# Patient Record
Sex: MALE | Race: WHITE | NOT HISPANIC OR LATINO | Employment: OTHER | URBAN - METROPOLITAN AREA
[De-identification: names, ages, dates, MRNs, and addresses within clinical notes are randomized per-mention and may not be internally consistent; named-entity substitution may affect disease eponyms.]

---

## 2019-04-11 ENCOUNTER — TRANSCRIBE ORDERS (OUTPATIENT)
Dept: LAB | Facility: HOSPITAL | Age: 74
End: 2019-04-11

## 2019-04-11 ENCOUNTER — APPOINTMENT (OUTPATIENT)
Dept: LAB | Facility: HOSPITAL | Age: 74
End: 2019-04-11
Attending: NURSE PRACTITIONER
Payer: MEDICARE

## 2019-04-11 DIAGNOSIS — I10 ESSENTIAL (PRIMARY) HYPERTENSION: ICD-10-CM

## 2019-04-11 DIAGNOSIS — Z95.1 PRESENCE OF AORTOCORONARY BYPASS GRAFT: ICD-10-CM

## 2019-04-11 DIAGNOSIS — I25.10 ATHEROSCLEROTIC HEART DISEASE OF NATIVE CORONARY ARTERY WITHOUT ANGINA PECTORIS: Primary | ICD-10-CM

## 2019-04-11 DIAGNOSIS — I34.0 NONRHEUMATIC MITRAL VALVE INSUFFICIENCY: ICD-10-CM

## 2019-04-11 DIAGNOSIS — I65.23 OCCLUSION AND STENOSIS OF BILATERAL CAROTID ARTERIES: ICD-10-CM

## 2019-04-11 DIAGNOSIS — C61 MALIGNANT NEOPLASM OF PROSTATE (CMS/HCC): Primary | ICD-10-CM

## 2019-04-11 DIAGNOSIS — E78.00 PURE HYPERCHOLESTEROLEMIA: ICD-10-CM

## 2019-04-11 DIAGNOSIS — I25.10 ATHEROSCLEROTIC HEART DISEASE OF NATIVE CORONARY ARTERY WITHOUT ANGINA PECTORIS: ICD-10-CM

## 2019-04-11 DIAGNOSIS — C61 MALIGNANT NEOPLASM OF PROSTATE (CMS/HCC): ICD-10-CM

## 2019-04-11 DIAGNOSIS — Z98.61 CORONARY ANGIOPLASTY STATUS: ICD-10-CM

## 2019-04-11 LAB
ALBUMIN SERPL-MCNC: 4.3 G/DL (ref 3.4–5)
ALP SERPL-CCNC: 83 IU/L (ref 35–126)
ALT SERPL-CCNC: 33 IU/L (ref 16–63)
ANION GAP SERPL CALC-SCNC: 8 MEQ/L (ref 3–15)
AST SERPL-CCNC: 33 IU/L (ref 15–41)
BILIRUB SERPL-MCNC: 0.6 MG/DL (ref 0.3–1.2)
BUN SERPL-MCNC: 12 MG/DL (ref 8–20)
CALCIUM SERPL-MCNC: 9.4 MG/DL (ref 8.9–10.3)
CHLORIDE SERPL-SCNC: 107 MEQ/L (ref 98–109)
CHOLEST SERPL-MCNC: 90 MG/DL
CK SERPL-CCNC: 137 U/L (ref 16–300)
CO2 SERPL-SCNC: 26 MEQ/L (ref 22–32)
CREAT SERPL-MCNC: 0.9 MG/DL
GFR SERPL CREATININE-BSD FRML MDRD: >60 ML/MIN/1.73M*2
GLUCOSE SERPL-MCNC: 104 MG/DL (ref 70–99)
HDLC SERPL-MCNC: 37 MG/DL
HDLC SERPL: 2.4 {RATIO}
LDLC SERPL CALC-MCNC: 41 MG/DL
NONHDLC SERPL-MCNC: 53 MG/DL
POTASSIUM SERPL-SCNC: 5 MEQ/L (ref 3.6–5.1)
PROT SERPL-MCNC: 6.1 G/DL (ref 6–8.2)
PSA SERPL-MCNC: 0.96 NG/ML
SODIUM SERPL-SCNC: 141 MEQ/L (ref 136–144)
TESTOST SERPL-MCNC: 348.5 NG/DL (ref 200–800)
TRIGL SERPL-MCNC: 60 MG/DL (ref 30–149)

## 2019-04-11 PROCEDURE — 36415 COLL VENOUS BLD VENIPUNCTURE: CPT

## 2019-04-11 PROCEDURE — 82550 ASSAY OF CK (CPK): CPT

## 2019-04-11 PROCEDURE — 84403 ASSAY OF TOTAL TESTOSTERONE: CPT

## 2019-04-11 PROCEDURE — 84153 ASSAY OF PSA TOTAL: CPT

## 2019-04-11 PROCEDURE — 80061 LIPID PANEL: CPT

## 2019-04-11 PROCEDURE — 80053 COMPREHEN METABOLIC PANEL: CPT

## 2019-10-17 ENCOUNTER — APPOINTMENT (OUTPATIENT)
Dept: LAB | Facility: HOSPITAL | Age: 74
End: 2019-10-17
Attending: INTERNAL MEDICINE
Payer: MEDICARE

## 2019-10-17 ENCOUNTER — TRANSCRIBE ORDERS (OUTPATIENT)
Dept: LAB | Facility: HOSPITAL | Age: 74
End: 2019-10-17

## 2019-10-17 DIAGNOSIS — I25.10 ATHEROSCLEROTIC HEART DISEASE OF NATIVE CORONARY ARTERY WITHOUT ANGINA PECTORIS: Primary | ICD-10-CM

## 2019-10-17 DIAGNOSIS — I25.10 ATHEROSCLEROTIC HEART DISEASE OF NATIVE CORONARY ARTERY WITHOUT ANGINA PECTORIS: ICD-10-CM

## 2019-10-17 DIAGNOSIS — I10 ESSENTIAL (PRIMARY) HYPERTENSION: ICD-10-CM

## 2019-10-17 DIAGNOSIS — I65.23 OCCLUSION AND STENOSIS OF BILATERAL CAROTID ARTERIES: ICD-10-CM

## 2019-10-17 DIAGNOSIS — Z98.61 CORONARY ANGIOPLASTY STATUS: ICD-10-CM

## 2019-10-17 DIAGNOSIS — Z95.1 PRESENCE OF AORTOCORONARY BYPASS GRAFT: ICD-10-CM

## 2019-10-17 LAB
ALBUMIN SERPL-MCNC: 4.1 G/DL (ref 3.4–5)
ALP SERPL-CCNC: 69 IU/L (ref 35–126)
ALT SERPL-CCNC: 29 IU/L (ref 16–63)
ANION GAP SERPL CALC-SCNC: 7 MEQ/L (ref 3–15)
AST SERPL-CCNC: 29 IU/L (ref 15–41)
BILIRUB SERPL-MCNC: 0.9 MG/DL (ref 0.3–1.2)
BUN SERPL-MCNC: 16 MG/DL (ref 8–20)
CALCIUM SERPL-MCNC: 9 MG/DL (ref 8.9–10.3)
CHLORIDE SERPL-SCNC: 107 MEQ/L (ref 98–109)
CHOLEST SERPL-MCNC: 122 MG/DL
CO2 SERPL-SCNC: 28 MEQ/L (ref 22–32)
CREAT SERPL-MCNC: 0.9 MG/DL
GFR SERPL CREATININE-BSD FRML MDRD: >60 ML/MIN/1.73M*2
GLUCOSE SERPL-MCNC: 109 MG/DL (ref 70–99)
HDLC SERPL-MCNC: 39 MG/DL
HDLC SERPL: 3.1 {RATIO}
LDLC SERPL CALC-MCNC: 45 MG/DL
NONHDLC SERPL-MCNC: 83 MG/DL
POTASSIUM SERPL-SCNC: 4.6 MEQ/L (ref 3.6–5.1)
PROT SERPL-MCNC: 6 G/DL (ref 6–8.2)
SODIUM SERPL-SCNC: 142 MEQ/L (ref 136–144)
TRIGL SERPL-MCNC: 188 MG/DL (ref 30–149)

## 2019-10-17 PROCEDURE — 36415 COLL VENOUS BLD VENIPUNCTURE: CPT

## 2019-10-17 PROCEDURE — 80053 COMPREHEN METABOLIC PANEL: CPT

## 2019-10-17 PROCEDURE — 80061 LIPID PANEL: CPT

## 2020-09-09 ENCOUNTER — TRANSCRIBE ORDERS (OUTPATIENT)
Dept: LAB | Facility: HOSPITAL | Age: 75
End: 2020-09-09

## 2020-09-09 ENCOUNTER — APPOINTMENT (OUTPATIENT)
Dept: LAB | Facility: HOSPITAL | Age: 75
End: 2020-09-09
Attending: NURSE PRACTITIONER
Payer: MEDICARE

## 2020-09-09 DIAGNOSIS — E78.00 PURE HYPERCHOLESTEROLEMIA: ICD-10-CM

## 2020-09-09 DIAGNOSIS — C61 MALIGNANT NEOPLASM OF PROSTATE (CMS/HCC): Primary | ICD-10-CM

## 2020-09-09 DIAGNOSIS — I10 ESSENTIAL (PRIMARY) HYPERTENSION: ICD-10-CM

## 2020-09-09 DIAGNOSIS — Z98.61 CORONARY ANGIOPLASTY STATUS: ICD-10-CM

## 2020-09-09 DIAGNOSIS — Z95.1 PRESENCE OF AORTOCORONARY BYPASS GRAFT: ICD-10-CM

## 2020-09-09 DIAGNOSIS — I25.10 ATHEROSCLEROTIC HEART DISEASE OF NATIVE CORONARY ARTERY WITHOUT ANGINA PECTORIS: ICD-10-CM

## 2020-09-09 DIAGNOSIS — I65.23 OCCLUSION AND STENOSIS OF BILATERAL CAROTID ARTERIES: ICD-10-CM

## 2020-09-09 DIAGNOSIS — C61 MALIGNANT NEOPLASM OF PROSTATE (CMS/HCC): ICD-10-CM

## 2020-09-09 DIAGNOSIS — I25.10 ATHEROSCLEROTIC HEART DISEASE OF NATIVE CORONARY ARTERY WITHOUT ANGINA PECTORIS: Primary | ICD-10-CM

## 2020-09-09 LAB
ALBUMIN SERPL-MCNC: 4.4 G/DL (ref 3.4–5)
ALP SERPL-CCNC: 88 IU/L (ref 35–126)
ALT SERPL-CCNC: 33 IU/L (ref 16–63)
ANION GAP SERPL CALC-SCNC: 9 MEQ/L (ref 3–15)
AST SERPL-CCNC: 26 IU/L (ref 15–41)
BILIRUB SERPL-MCNC: 1 MG/DL (ref 0.3–1.2)
BUN SERPL-MCNC: 18 MG/DL (ref 8–20)
CALCIUM SERPL-MCNC: 9.4 MG/DL (ref 8.9–10.3)
CHLORIDE SERPL-SCNC: 106 MEQ/L (ref 98–109)
CHOLEST SERPL-MCNC: 111 MG/DL
CO2 SERPL-SCNC: 25 MEQ/L (ref 22–32)
CREAT SERPL-MCNC: 1 MG/DL (ref 0.8–1.3)
GFR SERPL CREATININE-BSD FRML MDRD: >60 ML/MIN/1.73M*2
GLUCOSE SERPL-MCNC: 97 MG/DL (ref 70–99)
HDLC SERPL-MCNC: 38 MG/DL
HDLC SERPL: 2.9 {RATIO}
LDLC SERPL CALC-MCNC: 55 MG/DL
NONHDLC SERPL-MCNC: 73 MG/DL
POTASSIUM SERPL-SCNC: 4.9 MEQ/L (ref 3.6–5.1)
PROT SERPL-MCNC: 6.4 G/DL (ref 6–8.2)
PSA SERPL-MCNC: 0.9 NG/ML
SODIUM SERPL-SCNC: 140 MEQ/L (ref 136–144)
TRIGL SERPL-MCNC: 91 MG/DL (ref 30–149)

## 2020-09-09 PROCEDURE — 84153 ASSAY OF PSA TOTAL: CPT

## 2020-09-09 PROCEDURE — 80053 COMPREHEN METABOLIC PANEL: CPT

## 2020-09-09 PROCEDURE — 80061 LIPID PANEL: CPT

## 2020-09-09 PROCEDURE — 36415 COLL VENOUS BLD VENIPUNCTURE: CPT

## 2020-09-09 PROCEDURE — 84403 ASSAY OF TOTAL TESTOSTERONE: CPT

## 2020-09-10 LAB — TESTOST SERPL-MCNC: 365.5 NG/DL (ref 200–800)

## 2021-04-14 DIAGNOSIS — Z23 ENCOUNTER FOR IMMUNIZATION: ICD-10-CM

## 2021-04-19 ENCOUNTER — APPOINTMENT (OUTPATIENT)
Dept: LAB | Facility: HOSPITAL | Age: 76
End: 2021-04-19
Attending: INTERNAL MEDICINE
Payer: MEDICARE

## 2021-04-19 ENCOUNTER — TRANSCRIBE ORDERS (OUTPATIENT)
Dept: CARDIOLOGY | Facility: HOSPITAL | Age: 76
End: 2021-04-19

## 2021-04-19 DIAGNOSIS — Z98.61 CORONARY ANGIOPLASTY STATUS: ICD-10-CM

## 2021-04-19 DIAGNOSIS — I25.10 ATHEROSCLEROTIC HEART DISEASE OF NATIVE CORONARY ARTERY WITHOUT ANGINA PECTORIS: Primary | ICD-10-CM

## 2021-04-19 DIAGNOSIS — Z95.1 PRESENCE OF AORTOCORONARY BYPASS GRAFT: ICD-10-CM

## 2021-04-19 DIAGNOSIS — I10 ESSENTIAL (PRIMARY) HYPERTENSION: ICD-10-CM

## 2021-04-19 DIAGNOSIS — I65.23 OCCLUSION AND STENOSIS OF BILATERAL CAROTID ARTERIES: ICD-10-CM

## 2021-04-19 DIAGNOSIS — I25.10 ATHEROSCLEROTIC HEART DISEASE OF NATIVE CORONARY ARTERY WITHOUT ANGINA PECTORIS: ICD-10-CM

## 2021-04-19 LAB
ALBUMIN SERPL-MCNC: 4.4 G/DL (ref 3.4–5)
ALP SERPL-CCNC: 69 IU/L (ref 35–126)
ALT SERPL-CCNC: 30 IU/L (ref 16–63)
ANION GAP SERPL CALC-SCNC: 13 MEQ/L (ref 3–15)
AST SERPL-CCNC: 27 IU/L (ref 15–41)
BILIRUB SERPL-MCNC: 1.1 MG/DL (ref 0.3–1.2)
BUN SERPL-MCNC: 22 MG/DL (ref 8–20)
CALCIUM SERPL-MCNC: 9.2 MG/DL (ref 8.9–10.3)
CHLORIDE SERPL-SCNC: 107 MEQ/L (ref 98–109)
CHOLEST SERPL-MCNC: 107 MG/DL
CO2 SERPL-SCNC: 21 MEQ/L (ref 22–32)
CREAT SERPL-MCNC: 0.9 MG/DL (ref 0.8–1.3)
GFR SERPL CREATININE-BSD FRML MDRD: >60 ML/MIN/1.73M*2
GLUCOSE SERPL-MCNC: 97 MG/DL (ref 70–99)
HDLC SERPL-MCNC: 35 MG/DL
HDLC SERPL: 3.1 {RATIO}
LDLC SERPL CALC-MCNC: 55 MG/DL
NONHDLC SERPL-MCNC: 72 MG/DL
POTASSIUM SERPL-SCNC: 4.4 MEQ/L (ref 3.6–5.1)
PROT SERPL-MCNC: 6.6 G/DL (ref 6–8.2)
SODIUM SERPL-SCNC: 141 MEQ/L (ref 136–144)
TRIGL SERPL-MCNC: 85 MG/DL (ref 30–149)

## 2021-04-19 PROCEDURE — 80053 COMPREHEN METABOLIC PANEL: CPT

## 2021-04-19 PROCEDURE — 36415 COLL VENOUS BLD VENIPUNCTURE: CPT

## 2021-04-19 PROCEDURE — 80061 LIPID PANEL: CPT

## 2021-09-24 ENCOUNTER — TRANSCRIBE ORDERS (OUTPATIENT)
Dept: REGISTRATION | Facility: HOSPITAL | Age: 76
End: 2021-09-24
Payer: MEDICARE

## 2021-09-24 ENCOUNTER — APPOINTMENT (OUTPATIENT)
Dept: LAB | Facility: HOSPITAL | Age: 76
End: 2021-09-24
Attending: INTERNAL MEDICINE
Payer: MEDICARE

## 2021-09-24 DIAGNOSIS — I25.10 ATHEROSCLEROTIC HEART DISEASE OF NATIVE CORONARY ARTERY WITHOUT ANGINA PECTORIS: Primary | ICD-10-CM

## 2021-09-24 DIAGNOSIS — I65.23 OCCLUSION AND STENOSIS OF BILATERAL CAROTID ARTERIES: ICD-10-CM

## 2021-09-24 DIAGNOSIS — Z95.1 PRESENCE OF AORTOCORONARY BYPASS GRAFT: ICD-10-CM

## 2021-09-24 DIAGNOSIS — C61 MALIGNANT NEOPLASM OF PROSTATE (CMS/HCC): Primary | ICD-10-CM

## 2021-09-24 DIAGNOSIS — Z98.61 CORONARY ANGIOPLASTY STATUS: ICD-10-CM

## 2021-09-24 DIAGNOSIS — C61 MALIGNANT NEOPLASM OF PROSTATE (CMS/HCC): ICD-10-CM

## 2021-09-24 DIAGNOSIS — I10 ESSENTIAL (PRIMARY) HYPERTENSION: ICD-10-CM

## 2021-09-24 DIAGNOSIS — I25.10 ATHEROSCLEROTIC HEART DISEASE OF NATIVE CORONARY ARTERY WITHOUT ANGINA PECTORIS: ICD-10-CM

## 2021-09-24 LAB
ALBUMIN SERPL-MCNC: 4.2 G/DL (ref 3.4–5)
ALP SERPL-CCNC: 75 IU/L (ref 35–126)
ALT SERPL-CCNC: 39 IU/L (ref 16–63)
ANION GAP SERPL CALC-SCNC: 11 MEQ/L (ref 3–15)
AST SERPL-CCNC: 34 IU/L (ref 15–41)
BILIRUB SERPL-MCNC: 1 MG/DL (ref 0.3–1.2)
BUN SERPL-MCNC: 13 MG/DL (ref 8–20)
CALCIUM SERPL-MCNC: 9.2 MG/DL (ref 8.9–10.3)
CHLORIDE SERPL-SCNC: 105 MEQ/L (ref 98–109)
CHOLEST SERPL-MCNC: 128 MG/DL
CO2 SERPL-SCNC: 27 MEQ/L (ref 22–32)
CREAT SERPL-MCNC: 0.9 MG/DL (ref 0.8–1.3)
GFR SERPL CREATININE-BSD FRML MDRD: >60 ML/MIN/1.73M*2
GLUCOSE SERPL-MCNC: 104 MG/DL (ref 70–99)
HDLC SERPL-MCNC: 44 MG/DL
HDLC SERPL: 2.9 {RATIO}
LDLC SERPL CALC-MCNC: 60 MG/DL
NONHDLC SERPL-MCNC: 84 MG/DL
POTASSIUM SERPL-SCNC: 4.7 MEQ/L (ref 3.6–5.1)
PROT SERPL-MCNC: 6.3 G/DL (ref 6–8.2)
PSA SERPL-MCNC: 1.07 NG/ML
SODIUM SERPL-SCNC: 143 MEQ/L (ref 136–144)
TESTOST SERPL-MCNC: 333.3 NG/DL (ref 200–800)
TRIGL SERPL-MCNC: 118 MG/DL (ref 30–149)

## 2021-09-24 PROCEDURE — 80061 LIPID PANEL: CPT

## 2021-09-24 PROCEDURE — 80053 COMPREHEN METABOLIC PANEL: CPT

## 2021-09-24 PROCEDURE — 84403 ASSAY OF TOTAL TESTOSTERONE: CPT

## 2021-09-24 PROCEDURE — 84153 ASSAY OF PSA TOTAL: CPT

## 2021-09-24 PROCEDURE — 36415 COLL VENOUS BLD VENIPUNCTURE: CPT

## 2022-04-16 ENCOUNTER — APPOINTMENT (OUTPATIENT)
Dept: LAB | Facility: HOSPITAL | Age: 77
End: 2022-04-16
Attending: INTERNAL MEDICINE
Payer: MEDICARE

## 2022-04-16 ENCOUNTER — TRANSCRIBE ORDERS (OUTPATIENT)
Dept: LAB | Facility: HOSPITAL | Age: 77
End: 2022-04-16

## 2022-04-16 DIAGNOSIS — Z95.1 PRESENCE OF AORTOCORONARY BYPASS GRAFT: Primary | ICD-10-CM

## 2022-04-16 DIAGNOSIS — Z95.1 PRESENCE OF AORTOCORONARY BYPASS GRAFT: ICD-10-CM

## 2022-04-16 DIAGNOSIS — Z98.61 CORONARY ANGIOPLASTY STATUS: ICD-10-CM

## 2022-04-16 LAB
ALBUMIN SERPL-MCNC: 4.2 G/DL (ref 3.4–5)
ALP SERPL-CCNC: 81 IU/L (ref 35–126)
ALT SERPL-CCNC: 32 IU/L (ref 16–63)
ANION GAP SERPL CALC-SCNC: 7 MEQ/L (ref 3–15)
AST SERPL-CCNC: 29 IU/L (ref 15–41)
BILIRUB SERPL-MCNC: 0.9 MG/DL (ref 0.3–1.2)
BUN SERPL-MCNC: 14 MG/DL (ref 8–20)
CALCIUM SERPL-MCNC: 9.3 MG/DL (ref 8.9–10.3)
CHLORIDE SERPL-SCNC: 104 MEQ/L (ref 98–109)
CHOLEST SERPL-MCNC: 110 MG/DL
CO2 SERPL-SCNC: 28 MEQ/L (ref 22–32)
CREAT SERPL-MCNC: 0.9 MG/DL (ref 0.8–1.3)
GFR SERPL CREATININE-BSD FRML MDRD: >60 ML/MIN/1.73M*2
GLUCOSE SERPL-MCNC: 104 MG/DL (ref 70–99)
HDLC SERPL-MCNC: 30 MG/DL
HDLC SERPL: 3.7 {RATIO}
LDLC SERPL CALC-MCNC: 63 MG/DL
NONHDLC SERPL-MCNC: 80 MG/DL
POTASSIUM SERPL-SCNC: 4.9 MEQ/L (ref 3.6–5.1)
PROT SERPL-MCNC: 6.2 G/DL (ref 6–8.2)
SODIUM SERPL-SCNC: 139 MEQ/L (ref 136–144)
TRIGL SERPL-MCNC: 87 MG/DL (ref 30–149)

## 2022-04-16 PROCEDURE — 80053 COMPREHEN METABOLIC PANEL: CPT

## 2022-04-16 PROCEDURE — 80061 LIPID PANEL: CPT

## 2022-04-16 PROCEDURE — 36415 COLL VENOUS BLD VENIPUNCTURE: CPT

## 2022-09-21 ENCOUNTER — TRANSCRIBE ORDERS (OUTPATIENT)
Dept: REGISTRATION | Facility: HOSPITAL | Age: 77
End: 2022-09-21

## 2022-09-21 DIAGNOSIS — I10 ESSENTIAL (PRIMARY) HYPERTENSION: ICD-10-CM

## 2022-09-21 DIAGNOSIS — Z95.1 PRESENCE OF AORTOCORONARY BYPASS GRAFT: ICD-10-CM

## 2022-09-21 DIAGNOSIS — Z98.61 CORONARY ANGIOPLASTY STATUS: ICD-10-CM

## 2022-09-21 DIAGNOSIS — I65.23 OCCLUSION AND STENOSIS OF BILATERAL CAROTID ARTERIES: ICD-10-CM

## 2022-09-21 DIAGNOSIS — I25.10 ATHEROSCLEROTIC HEART DISEASE OF NATIVE CORONARY ARTERY WITHOUT ANGINA PECTORIS: Primary | ICD-10-CM

## 2022-09-21 DIAGNOSIS — I34.0 NONRHEUMATIC MITRAL (VALVE) INSUFFICIENCY: ICD-10-CM

## 2022-09-23 ENCOUNTER — APPOINTMENT (OUTPATIENT)
Dept: LAB | Facility: HOSPITAL | Age: 77
End: 2022-09-23
Attending: NURSE PRACTITIONER
Payer: MEDICARE

## 2022-09-23 ENCOUNTER — TRANSCRIBE ORDERS (OUTPATIENT)
Dept: REGISTRATION | Facility: HOSPITAL | Age: 77
End: 2022-09-23

## 2022-09-23 DIAGNOSIS — Z98.61 CORONARY ANGIOPLASTY STATUS: ICD-10-CM

## 2022-09-23 DIAGNOSIS — Z95.1 PRESENCE OF AORTOCORONARY BYPASS GRAFT: ICD-10-CM

## 2022-09-23 DIAGNOSIS — I34.0 NONRHEUMATIC MITRAL (VALVE) INSUFFICIENCY: ICD-10-CM

## 2022-09-23 DIAGNOSIS — Z85.46 PERSONAL HISTORY OF MALIGNANT NEOPLASM OF PROSTATE: Primary | ICD-10-CM

## 2022-09-23 DIAGNOSIS — Z85.46 PERSONAL HISTORY OF MALIGNANT NEOPLASM OF PROSTATE: ICD-10-CM

## 2022-09-23 DIAGNOSIS — I10 ESSENTIAL (PRIMARY) HYPERTENSION: ICD-10-CM

## 2022-09-23 DIAGNOSIS — I25.10 ATHEROSCLEROTIC HEART DISEASE OF NATIVE CORONARY ARTERY WITHOUT ANGINA PECTORIS: Primary | ICD-10-CM

## 2022-09-23 DIAGNOSIS — I25.10 ATHEROSCLEROTIC HEART DISEASE OF NATIVE CORONARY ARTERY WITHOUT ANGINA PECTORIS: ICD-10-CM

## 2022-09-23 DIAGNOSIS — I65.23 OCCLUSION AND STENOSIS OF BILATERAL CAROTID ARTERIES: ICD-10-CM

## 2022-09-23 LAB
ALBUMIN SERPL-MCNC: 4.3 G/DL (ref 3.4–5)
ALP SERPL-CCNC: 81 IU/L (ref 35–126)
ALT SERPL-CCNC: 30 IU/L (ref 16–63)
ANION GAP SERPL CALC-SCNC: 5 MEQ/L (ref 3–15)
AST SERPL-CCNC: 28 IU/L (ref 15–41)
BILIRUB SERPL-MCNC: 1.3 MG/DL (ref 0.3–1.2)
BUN SERPL-MCNC: 14 MG/DL (ref 8–20)
CALCIUM SERPL-MCNC: 9.4 MG/DL (ref 8.9–10.3)
CHLORIDE SERPL-SCNC: 105 MEQ/L (ref 98–109)
CHOLEST SERPL-MCNC: 110 MG/DL
CO2 SERPL-SCNC: 30 MEQ/L (ref 22–32)
CREAT SERPL-MCNC: 1 MG/DL (ref 0.8–1.3)
GFR SERPL CREATININE-BSD FRML MDRD: >60 ML/MIN/1.73M*2
GLUCOSE SERPL-MCNC: 107 MG/DL (ref 70–99)
HDLC SERPL-MCNC: 43 MG/DL
HDLC SERPL: 2.6 {RATIO}
LDLC SERPL CALC-MCNC: 53 MG/DL
NONHDLC SERPL-MCNC: 67 MG/DL
POTASSIUM SERPL-SCNC: 4.5 MEQ/L (ref 3.6–5.1)
PROT SERPL-MCNC: 6.4 G/DL (ref 6–8.2)
PSA SERPL-MCNC: 1.09 NG/ML
SODIUM SERPL-SCNC: 140 MEQ/L (ref 136–144)
TRIGL SERPL-MCNC: 72 MG/DL (ref 30–149)

## 2022-09-23 PROCEDURE — 84403 ASSAY OF TOTAL TESTOSTERONE: CPT

## 2022-09-23 PROCEDURE — 36415 COLL VENOUS BLD VENIPUNCTURE: CPT

## 2022-09-23 PROCEDURE — 84153 ASSAY OF PSA TOTAL: CPT

## 2022-09-23 PROCEDURE — 80053 COMPREHEN METABOLIC PANEL: CPT

## 2022-09-23 PROCEDURE — 80061 LIPID PANEL: CPT

## 2022-09-24 LAB — TESTOST SERPL-MCNC: 330.7 NG/DL (ref 200–800)

## 2023-09-18 ENCOUNTER — APPOINTMENT (OUTPATIENT)
Dept: LAB | Facility: HOSPITAL | Age: 78
End: 2023-09-18
Attending: NURSE PRACTITIONER
Payer: MEDICARE

## 2023-09-18 ENCOUNTER — TRANSCRIBE ORDERS (OUTPATIENT)
Dept: REGISTRATION | Facility: HOSPITAL | Age: 78
End: 2023-09-18

## 2023-09-18 DIAGNOSIS — Z98.61 CORONARY ANGIOPLASTY STATUS: ICD-10-CM

## 2023-09-18 DIAGNOSIS — I65.23 OCCLUSION AND STENOSIS OF BILATERAL CAROTID ARTERIES: ICD-10-CM

## 2023-09-18 DIAGNOSIS — I10 ESSENTIAL (PRIMARY) HYPERTENSION: ICD-10-CM

## 2023-09-18 DIAGNOSIS — Z85.46 PERSONAL HISTORY OF MALIGNANT NEOPLASM OF PROSTATE: Primary | ICD-10-CM

## 2023-09-18 DIAGNOSIS — Z85.46 PERSONAL HISTORY OF MALIGNANT NEOPLASM OF PROSTATE: ICD-10-CM

## 2023-09-18 DIAGNOSIS — I25.10 ATHEROSCLEROTIC HEART DISEASE OF NATIVE CORONARY ARTERY WITHOUT ANGINA PECTORIS: ICD-10-CM

## 2023-09-18 DIAGNOSIS — Z95.1 PRESENCE OF AORTOCORONARY BYPASS GRAFT: ICD-10-CM

## 2023-09-18 DIAGNOSIS — I25.10 ATHEROSCLEROTIC HEART DISEASE OF NATIVE CORONARY ARTERY WITHOUT ANGINA PECTORIS: Primary | ICD-10-CM

## 2023-09-18 LAB
ALBUMIN SERPL-MCNC: 4.4 G/DL (ref 3.5–5.7)
ALP SERPL-CCNC: 65 IU/L (ref 34–125)
ALT SERPL-CCNC: 29 IU/L (ref 7–52)
ANION GAP SERPL CALC-SCNC: 7 MEQ/L (ref 3–15)
AST SERPL-CCNC: 22 IU/L (ref 13–39)
BILIRUB SERPL-MCNC: 0.9 MG/DL (ref 0.3–1.2)
BUN SERPL-MCNC: 17 MG/DL (ref 7–25)
CALCIUM SERPL-MCNC: 9.2 MG/DL (ref 8.6–10.3)
CHLORIDE SERPL-SCNC: 103 MEQ/L (ref 98–107)
CHOLEST SERPL-MCNC: 108 MG/DL
CO2 SERPL-SCNC: 29 MEQ/L (ref 21–31)
CREAT SERPL-MCNC: 0.8 MG/DL (ref 0.7–1.3)
GFR SERPL CREATININE-BSD FRML MDRD: >60 ML/MIN/1.73M*2
GLUCOSE SERPL-MCNC: 98 MG/DL (ref 70–99)
HDLC SERPL-MCNC: 35 MG/DL
HDLC SERPL: 3.1 {RATIO}
LDLC SERPL CALC-MCNC: 52 MG/DL
NONHDLC SERPL-MCNC: 73 MG/DL
POTASSIUM SERPL-SCNC: 4.1 MEQ/L (ref 3.5–5.1)
PROT SERPL-MCNC: 6.7 G/DL (ref 6–8.2)
PSA SERPL-MCNC: 0.35 NG/ML
SODIUM SERPL-SCNC: 139 MEQ/L (ref 136–145)
TESTOST SERPL-MCNC: 319.7 NG/DL (ref 200–800)
TRIGL SERPL-MCNC: 103 MG/DL

## 2023-09-18 PROCEDURE — 36415 COLL VENOUS BLD VENIPUNCTURE: CPT

## 2023-09-18 PROCEDURE — 84403 ASSAY OF TOTAL TESTOSTERONE: CPT

## 2023-09-18 PROCEDURE — 84153 ASSAY OF PSA TOTAL: CPT

## 2023-09-18 PROCEDURE — 80061 LIPID PANEL: CPT

## 2023-09-18 PROCEDURE — 80053 COMPREHEN METABOLIC PANEL: CPT

## 2023-09-25 ENCOUNTER — TRANSCRIBE ORDERS (OUTPATIENT)
Dept: PHYSICAL THERAPY | Facility: REHABILITATION | Age: 78
End: 2023-09-25

## 2023-09-25 DIAGNOSIS — S70.12XA CONTUSION OF LEFT THIGH, INITIAL ENCOUNTER: Primary | ICD-10-CM

## 2023-10-11 ENCOUNTER — HOSPITAL ENCOUNTER (OUTPATIENT)
Dept: PHYSICAL THERAPY | Facility: REHABILITATION | Age: 78
Setting detail: THERAPIES SERIES
Discharge: HOME | End: 2023-10-11
Attending: FAMILY MEDICINE
Payer: MEDICARE

## 2023-10-11 DIAGNOSIS — S70.12XA CONTUSION OF LEFT THIGH, INITIAL ENCOUNTER: ICD-10-CM

## 2023-10-11 PROCEDURE — 97530 THERAPEUTIC ACTIVITIES: CPT | Mod: GP

## 2023-10-11 PROCEDURE — 97162 PT EVAL MOD COMPLEX 30 MIN: CPT | Mod: GP

## 2023-10-11 NOTE — LETTER
Dear DR. Virk,    Thank you for this referral. Please review the attached notes and plan of care for your approval.  Please contact our department with any questions.     Sincerely,     Christine Blair Mulvihill, PT  [address]  [place] [address]  Phone [phone]  Fax  [phone]    By co-signing this Plan of Care (POC) you agree to the following:  I have reviewed the the Plan of Care established by the therapist within this document and certify that the services are skilled and medically necessary. I have reviewed the plan and recommend that these services continue to meet the goals stated in this document.    PHYSICIAN SIGNATURE: __________________________________     DATE: ___________________  TIME: _____________           Physical Therapy Plan of Care 10/11/23   Effective from: 10/11/2023  Effective to: 2024    Plan ID: [id number]            Participants as of Finalize on 10/14/2023    Name Type Comments Contact Info    Eduardo Virk MD PCP - General  [phone]    Christine Blair Mulvihill, PT Physical Therapist         Last Progress Notes Note     Author: Mulvihill, Christine Blair, PT Status: Signed Last edited: 10/11/2023  2:00 PM         Physical Therapy Evaluation    [facility] Fax: [phone]    PT EVALUATION FOR OUTPATIENT THERAPY    Patient: Les Garcia    MRN: [id number]  : 1945 78 y.o.     Referring Physician: Eduardo Virk MD  Date of Visit: 10/11/2023      Certification Dates:  10/11/23 through 24         Recommended Frequency & Duration:  2 times/week for up to 3 months     Diagnosis:   1. Contusion of left thigh, initial encounter        Chief Complaints: No chief complaint on file.      Precautions: cardiac  Precautions additional comments: MI, cardiac stent, right foot drop    Past Medical History:   Past Medical History:   Diagnosis Date    Fracture of both wrists     MI (myocardial infarction) (CMS/[facility])     Prostate cancer (CMS/[facility])      radiation treatment    Right foot drop 1976    Right knee dislocation 1976       Past Surgical History:   Past Surgical History:   Procedure Laterality Date    CORONARY ANGIOPLASTY WITH STENT PLACEMENT      per pt report    CORONARY ARTERY BYPASS GRAFT  2002    quintuple per pt reports    DISTAL POPLITEAL ARTERY BYPASS Right     per pt report         LEARNING ASSESSMENT    Assessment completed:  Yes    Learner name:  Les    Learner: Patient    Learning Barriers:  Learning barriers: No Barriers    Preferred Language: English     Needed: No    Education Provided:   Method: Discussion and Demonstration  Readiness: eager  Response: Needs reinforcement      CO-LEARNER ASSESSMENT:    Completed: No    Welcome letter discussed: Yes Patient provided with Welcome Letter, which includes attendance policy. Provided education regarding cancellation and no-show policy. Education regarding the importance of participation and regular attendance to maximize goal attainment.         OBJECTIVE MEASUREMENTS/DATA:    Time In Session:  Start Time: 1400  Stop Time: 1500  Time Calculation (min): 60 min   Assessment and Plan - 10/12/23 1243        Assessment    Plan of Care reviewed and patient/family in agreement Yes     System Pathology/Pathophysiology Noted cardiovascular;musculoskeletal     Actions taken IE, TA     Clinical Assessment Les presents s/p fall due to right foot drop resulting in pain in his left thigh/hamstring.  Objectively Les shows decreased flexibility of his left HS, hip rotators, decreased strength of left HS, hip extensors/add/abductors, inability to SLS bilaterally, limited repetitions with 30 sec STS, tightness/spasm with palption to left medial HS, and an overall decrease in right LE ROM and strength which directly affects the functioning of his left LE.  Per LEFS, Les is currently 76% disabled and appears as a good candidate for PT per POC to not only address left thigh pain and dysfunction  but to address the foot of the fall which is his right foot drop.  He will benefit from assessment by his MD and  an orthotist to assess if an AFO is appropriate to address his right foot drop to prevent gait deviations and left leg pain which may lead to further falls and potential injury.     Plan and Recommendations Initiate left thigh PT as ordered and address right foot drop/gait deviation.  Request referral to orthotist by MD.  Issue primary HEP.     Planned Services CPT 73168 Manual therapy;CPT 25575 Neuromuscular Reeducation;CPT 32565 Orthotics training (Initial encounter);CPT 01503 Orthotics/Prosthetics management and training (Subsequent encounters);CPT 48149 Self-care/Home management training;CPT 29795 Therapeutic activities;CPT 12691 Therapeutic exercises;CPT 17844 Electrical stimulation UNATTENDED;CPT 35292 Hot/Cold Packs therapy;CPT 90597 Ultrasound;CPT 52311 Gait training                General Information - 10/11/23 4643        Session Details    Document Type initial evaluation     Mode of Treatment individual therapy        General Information    Onset of Illness/Injury or Date of Surgery 09/06/23     Referring Physician Eduardo Virk MD     History of present illness/functional impairment On Sept 6th tripped on his right drop foot and twisted around landing on his left hip area.  Pt was seen in ED and special tests were performed (-) for fractures. Pt was away at the time on vacation so followed up with ortho upon return and was referred to outpatient PT. 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   He notes that he does have some dull pain lingering however it is overall much improved. adls:  increased pain with walking fast,  aching in leg with adls with associated decreased mobility.   Pts goals:  eliminate left Hamstring pain and improve mobility.  He would also like to not trip  anymore so would like his right foot drop addressed at some point including possible AFOs.     Existing Precautions/Restrictions cardiac     Precautions comments MI, cardiac stent, right foot drop         Services    Do You Speak a Language Other Than English at Home? no                Pain/Vitals - 10/11/23 1353        Pain Assessment    Currently in pain Yes     Preferred Pain Scale number (Numeric Rating Pain Scale)     Pain Side/Orientation left     Pain: Body location Leg   hamstring    Pain Rating (0-10): Pre Activity 2   best = 2/10   worst = 4/10       Pre Activity Vital Signs    Pulse 60     Oxygen Therapy None (Room air)     /64     BP Method Manual     Patient Position Sitting                Falls/Food Screening - 10/11/23 1353        Initial Falls Assessment    One or more falls in the last year Yes     How many times 2 or more   fell 3x due to right foot drop    Was the patient injured in any fall Yes   left HS injury he is her to address    Fall prevention interventions recommended Use assistive and mobility devices   will refer to services for possible assessment of AFO or appropriate gait aid       Food Insecurity    Within the past 12 months, you worried that your food would run out before you got the money to buy more. Never true     Within the past 12 months, the food you bought just didn't last and you didn't have money to get more. Never true                PT - 10/11/23 2255        Physical Therapy    Physical Therapy [facility] PT        PT Plan    Frequency of treatment 2 times/week     PT Duration 3 months     PT Cert From 10/11/23     PT Cert To 01/11/24     Date PT POC was sent to provider 10/12/23     Signed PT Plan of Care received?  No                   ROM    Range of Motion - 10/14/23 2300        RIGHT: Lower Extremity PROM Assessment    Hip Flexion  80 degrees   SLR    Hip Internal Rotation  22 degrees     Hip External Rotation  45 degrees         LEFT: Lower Extremity PROM Assessment    Hip Flexion  58 degrees   SLR    Hip Internal Rotation  14 degrees     Hip External Rotation  45 degrees        RIGHT: Lower Extremity AROM Assessment    Hip Flexion   90 degrees     Knee Flexion   132     Knee Extension   0     Ankle Dorsiflexion   -15 degrees     Ankle Plantarflexion   58 degrees     Ankle Inversion   24 degrees     Ankle Eversion   4 degrees        LEFT: Lower Extremity AROM Assessment    Hip Flexion   102 degrees     Hip Extension   113 degrees     Ankle Dorsiflexion   8 degrees     Ankle Plantarflexion   55 degrees     Ankle Inversion   30 degrees     Ankle Eversion   18 degrees               MMT    Manual Muscle Tests - 10/14/23 2300        RIGHT: Lower Extremity Manual Muscle Test Assessment    Hip Flexion gross movement (4+/5) good plus     Hip Extension gross movement (5/5) normal     Hip Abduction gross movement (5/5) normal     Hip Adduction gross movement (5/5) normal     Knee Flexion strength (4/5) good     Knee Extension strength (5/5) normal     Ankle Dorsiflexion gross movement (2/5) poor     Ankle Plantarflexion gross movement (4+/5) good plus     Ankle Inversion gross movement (4/5) good     Ankle Eversion gross movement (4-/5) good minus        LEFT: Lower Extremity Manual Muscle Test Assessment    Hip Flexion gross movement (5/5) normal     Hip Extension gross movement (4/5) good     Hip Abduction gross movement (4+/5) good plus     Hip Adduction gross movement (4+/5) good plus     Knee Flexion strength (4/5) good     Knee Extension strength (5/5) normal     Ankle Dorsiflexion gross movement (5/5) normal     Ankle Plantarflexion gross movement (5/5) normal     Ankle Inversion gross movement (4+/5) good plus     Ankle Eversion gross movement (4-/5) good minus               Palpation    Palpation - 10/11/23 1800        Palpation    LE Palpation  tender with spasm to left medial HS               Gait and Mobility    Gait and Mobility -  "10/14/23 2300        Gait Training    [place], Gait modified independence     Gait surface comments flat     Assistive Device none     Deviations/Abnormal Patterns left sided deviations;right sided deviations;steppage                  Goals       <enter goal here> (pt-stated)       Mutually agreed upon pain goal       Mutually agreed upon pain goal:  Les will be able to ambulate at a fast pace without complaints of left leg pain.        PT left leg/gait dysfunction Goals           Short & Long Term Goals Time Frame Result Comment/Progress   Assess 6mWT 2 weeks        Pt will be independent in primary HEP 4-6 weeks       Patient pain will decrease from >5/10 to less <3/10 for all functional mobility  4-6 weeks         Pt with decreased pain while \"walking fast\" with ability to ambulate on TM for 5 minutes at 2.5-3.0 mph.   4-6 weeks       Pt will increase bilateral SLR by 5-10 deg to allow for improved stride length to normalize gait pattern. 4-6 weeks       Pt will show improved strength of right HS, hip add/abd/ext by 1/2 grade to allow for improved gait and tolerance for adls. 8-12 weeks       Increase LEFS >/= 9 points to increase function. 4-6 weeks       Increase LEFS >/= 18 points to increase function   8-12 weeks       Improve 30 sec STS to 13 reps or more without UE use to indicate improving LE functional strength. 8-12 weeks       Pt will be independent with advanced HEP to increase carryover at home 8-12 weeks       Pt will increase distance during 6mWT by 191 feet or more to meet the MCID indicating significant improvement in endurance. 4-6 weeks       Pt will increase distance during 6mWT by an additional 191 feet or more to meet the MCID indicating significant improvement in endurance. 8-12 weeks       Pt will be properly referred to MD and orthotist for assessment for right LE AFO to improve gait dynamics to decrease left LE pain. 8-12 weeks                    TREATMENT PLAN:    ORTHO PT " FLOWSHEET    Exercises Current Session Time PERFORMED?   MODALITIES- HEAT/ICE  CPT 07289 TOTAL TIME FOR SESSION Not performed    Heat     Ice/Vasocompression     MODALITIES - E-STIM  CPT 76887   TOTAL TIME FOR SESSION Not performed    E-stim/H-Wave     MODALITIES - US TOTAL TIME FOR SESSION Not performed    US (CPT 17480)     Iontophoresis (CPT 63828)     Mechanical Traction     THER ACT  CPT 81014 TOTAL TIME FOR SESSION 8-22 Minutes    Pt education Pt educated regarding IE results and POC and need for active participation in skilled PT intervention and HEP.  Discussed with pt follow up with MD regarding possible right LE AFO to avoid further tripping and reinjury. Y   Transfer training     Body Mechanics/Work Training     THER EX  CPT 67502 TOTAL TIME FOR SESSION Not performed    CARDIOVASCULAR      Nu Step nv    Stationary Bike     Elliptical     Treadmill     UBE     STRENGTHENING     Supine ther-ex     Seated ther-ex     Standing ther-ex     STRETCHING     LE stretching     Other stretching     REPEATED MOVEMENTS     NEURO RE-ED  CPT 28710 TOTAL TIME FOR SESSION Not performed    COORDINATION     POSTURAL RE-ED        PRE-GAIT ACTIVITIES      BALANCE TRAINING      Sitting balance     Standing balance     GAIT TRAINING  CPT 75294 TOTAL TIME FOR SESSION Not performed    Ambulation  With trial right AFO    Dynamic gait      Stair negotiation     Curb negotiation     Ramp negotiation     Outdoor ambulation     BWS/vector training     MANUAL  CPT 50990 TOTAL TIME FOR SESSION Not performed    Stretching       B HS       B hip rotators       B hip flexors     Mobilization      Massage DTM to left Medial HS    Taping  prn        ASSESSMENT:    This 78 y.o. year old male presents to PT with above stated diagnosis. Physical Therapy evaluation reveals   resulting in   limitations. Les Garcia will benefit from skilled PT services to address limitation, work towards rehab and patient goals and maximize PLOF of chosen  ADLs.     Planned Services: The patient's treatment will include CPT 27716 Manual therapy, CPT 51622 Neuromuscular Reeducation, CPT 13575 Orthotics training (Initial encounter), CPT 77245 Orthotics/Prosthetics management and training (Subsequent encounters), CPT 35639 Self-care/Home management training, CPT 96786 Therapeutic activities, CPT 00207 Therapeutic exercises, CPT 58550 Electrical stimulation UNATTENDED, CPT 49107 Hot/Cold Packs therapy, CPT 55046 Ultrasound, CPT 34933 Gait training, .     Christine Blair Mulvihill, PT                         Current Participants as of 10/14/2023    Name Type Comments Contact Info    Eduardo Virk MD PCP - General  [phone]    Signature pending    Christine Blair Mulvihill, PT Physical Therapist      Signature pending

## 2023-10-15 NOTE — OP PT TREATMENT LOG
ORTHO PT FLOWSHEET    Exercises Current Session Time PERFORMED?   MODALITIES- HEAT/ICE  CPT 52564 TOTAL TIME FOR SESSION Not performed    Heat     Ice/Vasocompression     MODALITIES - E-STIM  CPT 76275   TOTAL TIME FOR SESSION Not performed    E-stim/H-Wave     MODALITIES - US TOTAL TIME FOR SESSION Not performed    US (CPT 33310)     Iontophoresis (CPT 84724)     Mechanical Traction     THER ACT  CPT 31377 TOTAL TIME FOR SESSION 8-22 Minutes    Pt education Pt educated regarding IE results and POC and need for active participation in skilled PT intervention and HEP.  Discussed with pt follow up with MD regarding possible right LE AFO to avoid further tripping and reinjury. Y   Transfer training     Body Mechanics/Work Training     THER EX  CPT 56497 TOTAL TIME FOR SESSION Not performed    CARDIOVASCULAR      Nu Step nv    Stationary Bike     Elliptical     Treadmill     UBE     STRENGTHENING     Supine ther-ex     Seated ther-ex     Standing ther-ex     STRETCHING     LE stretching     Other stretching     REPEATED MOVEMENTS     NEURO RE-ED  CPT 37930 TOTAL TIME FOR SESSION Not performed    COORDINATION     POSTURAL RE-ED        PRE-GAIT ACTIVITIES      BALANCE TRAINING      Sitting balance     Standing balance     GAIT TRAINING  CPT 91338 TOTAL TIME FOR SESSION Not performed    Ambulation  With trial right AFO    Dynamic gait      Stair negotiation     Curb negotiation     Ramp negotiation     Outdoor ambulation     BWS/vector training     MANUAL  CPT 99835 TOTAL TIME FOR SESSION Not performed    Stretching       B HS       B hip rotators       B hip flexors     Mobilization      Massage DTM to left Medial HS    Taping  prn

## 2023-10-16 ENCOUNTER — DOCUMENTATION (OUTPATIENT)
Dept: SOCIAL WORK | Facility: REHABILITATION | Age: 78
End: 2023-10-16
Payer: MEDICARE

## 2023-10-16 NOTE — PROGRESS NOTES
10/16/23: CM was asked to get the patient a script for an AFO. CM sent the pcp a message. MICHAEL Quiles.

## 2023-10-18 ENCOUNTER — HOSPITAL ENCOUNTER (OUTPATIENT)
Dept: PHYSICAL THERAPY | Facility: REHABILITATION | Age: 78
Setting detail: THERAPIES SERIES
Discharge: HOME | End: 2023-10-18
Attending: FAMILY MEDICINE
Payer: MEDICARE

## 2023-10-18 DIAGNOSIS — S70.12XA CONTUSION OF LEFT THIGH, INITIAL ENCOUNTER: Primary | ICD-10-CM

## 2023-10-18 PROCEDURE — 97110 THERAPEUTIC EXERCISES: CPT | Mod: GP

## 2023-10-18 PROCEDURE — 97140 MANUAL THERAPY 1/> REGIONS: CPT | Mod: GP

## 2023-10-18 PROCEDURE — 97530 THERAPEUTIC ACTIVITIES: CPT | Mod: GP

## 2023-10-18 NOTE — OP PT TREATMENT LOG
"ORTHO PT FLOWSHEET    Exercises Current Session Time PERFORMED?   MODALITIES- HEAT/ICE  CPT 80756 TOTAL TIME FOR SESSION Not performed    Heat     Ice/Vasocompression     MODALITIES - E-STIM  CPT 87652   TOTAL TIME FOR SESSION Not performed    E-stim/H-Wave     MODALITIES - US TOTAL TIME FOR SESSION Not performed    US (CPT 55349)     Iontophoresis (CPT 13631)     Mechanical Traction     THER ACT  CPT 18206 TOTAL TIME FOR SESSION 8-22 Minutes   (15 min)    Pt education Pt educated regarding IE results and POC and need for active participation in skilled PT intervention and HEP.  Discussed with pt follow up with MD regarding possible right LE AFO to avoid further tripping and reinjury.  10/18/23 - reviewed possibility of AFO and pt shown normal hinged AFO, toe off and Spry step brace as options for dorsiflexion assistance in right LE to prevent further tripping/falling injuries.  Reviewed with pt heel toe gait pattern.  Also to discuss possibility of dorsiflexion assist brace vs/with trial of PT to improve mobility in right ankle as it is cause of left leg injury.        Y   Transfer training     Body Mechanics/Work Training     THER EX  CPT 33387 TOTAL TIME FOR SESSION 23-37 Minutes   (25 min)    CARDIOVASCULAR      Nu Step     Recumbent Bike L1  X 7 minutes Y   Elliptical     Treadmill     UBE     STRENGTHENING     Supine ther-ex       Bridges       BKFO  Prone       HS curls         2/10         Y   Seated ther-ex       LAQ      STS        nv  nv   Standing ther-ex       Hip flex       Hip ext       Hip abd       Hip add    nv  nv  nv  nv   STRETCHING     LE stretching       HS strap stretch       Hip IR       Hip ER   3/30\"   3/30\"  3/30\"   Y  Y  Y   Other stretching     REPEATED MOVEMENTS     NEURO RE-ED  CPT 03642 TOTAL TIME FOR SESSION Not performed    COORDINATION     POSTURAL RE-ED        PRE-GAIT ACTIVITIES      BALANCE TRAINING      Sitting balance     Standing balance     GAIT TRAINING  CPT 62106 TOTAL " "TIME FOR SESSION Not performed    Ambulation  With trial right AFO    Dynamic gait      Stair negotiation     Curb negotiation     Ramp negotiation     Outdoor ambulation     BWS/vector training     MANUAL  CPT 04538 TOTAL TIME FOR SESSION 8-22 Minutes   (15 min)    Stretching        HS        Hip rotators        Hip flexors \  3/20\"  3/20\"  3/20\"   Y  Y  Y   Mobilization      Massage DTM to left Medial HS pt prone Y   Taping  prn    10/18/23  Emailed HEP to [email or web address]     HOME EXERCISE PROGRAM  Created by Christine Mulvihill  Oct 18th, 2023  View videos at [email or web address]    4 Exercises    HAMSTRING STRETCH WITH TOWEL    While lying down on your back, hook a towel or strap under your foot and draw up your leg until a stretch is felt along the backside of your leg.    Keep your knee in a straightened position during the stretch.    Video # [id number] Repeat 3 Times   Hold 30 Seconds   Complete 1 Set   Perform 2 Times a Day          QUADRICEPS STRETCH - SIDE LYING    Lie on your side with your target limb on top. Next, grab your target limb below the knee and pull your knee into a more bent position until a stretch is felt along the front of your thigh. Repeat 3 Times   Hold 30 Seconds   Complete 1 Set   Perform 2 Times a Day          SUPINE HIP EXTERNAL ROTATION STRETCH - MODIFIED NOAH OR FIGURE 4    While lying on your back with your leg crossed over your knee, use your hand and push the crossed knee away from you as shown.    Video # [id number] Repeat 3 Times   Hold 30 Seconds   Complete 1 Set   Perform 2 Times a Day          Piriformis Stretch    Lie on your back with your uninvolved leg bent and the ankle of your involved leg crossed over the top (as shown). Grab the knee of the involved leg with both hands and stretch toward your chest and then over in the direction of the opposite shoulder. Hold the stretch for 15 seconds, then relax.    Do 5 - 15 second stretches on each leg. Repeat 3 Times   Hold 30 " Seconds   Perform 2 Times

## 2023-10-18 NOTE — PROGRESS NOTES
Physical Therapy Visit    PT DAILY NOTE FOR OUTPATIENT THERAPY    Patient: Les Garcia MRN: [id number]  : 1945 78 y.o.  Referring Physician: Eduardo Virk MD  Date of Visit: 10/18/2023    Certification Dates: 10/11/23 through 24    Diagnosis:   1. Contusion of left thigh, initial encounter        Chief Complaints:  pain and decreased adl functioning    Precautions:   Existing Precautions/Restrictions: cardiac  Precautions comments: MI, cardiac stent, right foot drop      TODAY'S VISIT    Time In Session:  Start Time: 1405  Stop Time: 1500  Time Calculation (min): 55 min   History/Vitals/Pain/Encounter Info - 10/18/23 1359        Injury History/Precautions/Daily Required Info    Document Type daily treatment     Primary Therapist Christine Mulvihill, PT     Chief Complaint/Reason for Visit  pain and decreased adl functioning     Onset of Illness/Injury or Date of Surgery 23     Referring Physician Eduardo Virk MD     Existing Precautions/Restrictions cardiac     Precautions comments MI, cardiac stent, right foot drop     History of present illness/functional impairment On  tripped on his right drop foot and twisted around landing on his left hip area.  Pt was seen in ED and special tests were performed (-) for fractures. Pt was away at the time on vacation so followed up with ortho upon return and was referred to outpatient PT. 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   He notes that he does have some dull pain lingering however it is overall much improved. adls:  increased pain with walking fast,  aching in leg with adls with associated decreased mobility.   Pts goals:  eliminate left Hamstring pain and improve mobility.  He would also like to not trip anymore so would like his right foot drop addressed at some point including possible AFOs.      Patient/Family/Caregiver Comments/Observations Pt was sitting on a chair in the doctors office and notes he hit the sore spot on his HS on the seat and it was painful while he was sitting.     Patient reported fall since last visit No        Pain Assessment    Currently in pain Yes     Preferred Pain Scale number (Numeric Rating Pain Scale)     Pain Side/Orientation left     Pain: Body location Leg     Pain Rating (0-10): Pre Activity 2     Pain Rating (0-10): Post Activity 1        Pain Intervention    Intervention  TE, MT     Post Intervention Comments decreased pain post treatment in left HS        Pre Activity Vital Signs    Pulse 62     SpO2 97 %     Oxygen Therapy None (Room air)     /48     BP Location Left upper arm     BP Method Manual     Patient Position Sitting         Services    Do You Speak a Language Other Than English at Home? no                       OBJECTIVE DATA TAKEN TODAY:    None taken    Today's Treatment:    ORTHO PT FLOWSHEET    Exercises Current Session Time PERFORMED?   MODALITIES- HEAT/ICE  CPT 58498 TOTAL TIME FOR SESSION Not performed    Heat     Ice/Vasocompression     MODALITIES - E-STIM  CPT 85407   TOTAL TIME FOR SESSION Not performed    E-stim/H-Wave     MODALITIES - US TOTAL TIME FOR SESSION Not performed    US (CPT 63797)     Iontophoresis (CPT 93922)     Mechanical Traction     THER ACT  CPT 75420 TOTAL TIME FOR SESSION 8-22 Minutes   (15 min)    Pt education Pt educated regarding IE results and POC and need for active participation in skilled PT intervention and HEP.  Discussed with pt follow up with MD regarding possible right LE AFO to avoid further tripping and reinjury.  10/18/23 - reviewed possibility of AFO and pt shown normal hinged AFO, toe off and Spry step brace as options for dorsiflexion assistance in right LE to prevent further tripping/falling injuries.  Reviewed with pt heel toe gait pattern.  Also to discuss possibility of dorsiflexion assist  "brace vs/with trial of PT to improve mobility in right ankle as it is cause of left leg injury.        Y   Transfer training     Body Mechanics/Work Training     THER EX  CPT 55142 TOTAL TIME FOR SESSION 23-37 Minutes   (25 min)    CARDIOVASCULAR      Nu Step     Recumbent Bike L1  X 7 minutes Y   Elliptical     Treadmill     UBE     STRENGTHENING     Supine ther-ex       Bridges       BKFO  Prone       HS curls         2/10         Y   Seated ther-ex       LAQ      STS        nv  nv   Standing ther-ex       Hip flex       Hip ext       Hip abd       Hip add    nv  nv  nv  nv   STRETCHING     LE stretching       HS strap stretch       Hip IR       Hip ER   3/30\"   3/30\"  3/30\"   Y  Y  Y   Other stretching     REPEATED MOVEMENTS     NEURO RE-ED  CPT 03263 TOTAL TIME FOR SESSION Not performed    COORDINATION     POSTURAL RE-ED        PRE-GAIT ACTIVITIES      BALANCE TRAINING      Sitting balance     Standing balance     GAIT TRAINING  CPT 59067 TOTAL TIME FOR SESSION Not performed    Ambulation  With trial right AFO    Dynamic gait      Stair negotiation     Curb negotiation     Ramp negotiation     Outdoor ambulation     BWS/vector training     MANUAL  CPT 86286 TOTAL TIME FOR SESSION 8-22 Minutes   (15 min)    Stretching        HS        Hip rotators        Hip flexors \  3/20\"  3/20\"  3/20\"   Y  Y  Y   Mobilization      Massage DTM to left Medial HS pt prone Y   Taping  prn    10/18/23  Emailed HEP to [email or web address]     HOME EXERCISE PROGRAM  Created by Christine Mulvihill  Oct 18th, 2023  View videos at [email or web address]    4 Exercises    HAMSTRING STRETCH WITH TOWEL    While lying down on your back, hook a towel or strap under your foot and draw up your leg until a stretch is felt along the backside of your leg.    Keep your knee in a straightened position during the stretch.    Video # [id number] Repeat 3 Times   Hold 30 Seconds   Complete 1 Set   Perform 2 Times a Day          QUADRICEPS STRETCH - SIDE " LYING    Lie on your side with your target limb on top. Next, grab your target limb below the knee and pull your knee into a more bent position until a stretch is felt along the front of your thigh. Repeat 3 Times   Hold 30 Seconds   Complete 1 Set   Perform 2 Times a Day          SUPINE HIP EXTERNAL ROTATION STRETCH - MODIFIED NOAH OR FIGURE 4    While lying on your back with your leg crossed over your knee, use your hand and push the crossed knee away from you as shown.    Video # [id number] Repeat 3 Times   Hold 30 Seconds   Complete 1 Set   Perform 2 Times a Day          Piriformis Stretch    Lie on your back with your uninvolved leg bent and the ankle of your involved leg crossed over the top (as shown). Grab the knee of the involved leg with both hands and stretch toward your chest and then over in the direction of the opposite shoulder. Hold the stretch for 15 seconds, then relax.    Do 5 - 15 second stretches on each leg. Repeat 3 Times   Hold 30 Seconds   Perform 2 Times

## 2023-10-20 ENCOUNTER — HOSPITAL ENCOUNTER (OUTPATIENT)
Dept: PHYSICAL THERAPY | Facility: REHABILITATION | Age: 78
Setting detail: THERAPIES SERIES
Discharge: HOME | End: 2023-10-20
Attending: FAMILY MEDICINE
Payer: MEDICARE

## 2023-10-20 DIAGNOSIS — S70.12XA CONTUSION OF LEFT THIGH, INITIAL ENCOUNTER: Primary | ICD-10-CM

## 2023-10-20 PROCEDURE — 97110 THERAPEUTIC EXERCISES: CPT | Mod: GP,CQ

## 2023-10-20 PROCEDURE — 97530 THERAPEUTIC ACTIVITIES: CPT | Mod: GP,CQ

## 2023-10-20 PROCEDURE — 97140 MANUAL THERAPY 1/> REGIONS: CPT | Mod: GP,CQ

## 2023-10-20 NOTE — PROGRESS NOTES
Physical Therapy Visit    PT DAILY NOTE FOR OUTPATIENT THERAPY    Patient: Lse Garcia MRN: [id number]  : 1945 78 y.o.  Referring Physician: Eduardo Virk MD  Date of Visit: 10/20/2023    Certification Dates: 10/11/23 through 24    Diagnosis:   1. Contusion of left thigh, initial encounter        Chief Complaints:  pain and decreased adl functioning    Precautions:   Existing Precautions/Restrictions: cardiac  Precautions comments: MI, cardiac stent, right foot drop      TODAY'S VISIT    Time In Session:  Start Time: 1405  Stop Time: 1500  Time Calculation (min): 55 min   History/Vitals/Pain/Encounter Info - 10/20/23 1401        Injury History/Precautions/Daily Required Info    Document Type daily treatment     Primary Therapist Christine Mulvihill, PT     Chief Complaint/Reason for Visit  pain and decreased adl functioning     Onset of Illness/Injury or Date of Surgery 23     Referring Physician Eduardo Virk MD     Existing Precautions/Restrictions cardiac     Precautions comments MI, cardiac stent, right foot drop     History of present illness/functional impairment On  tripped on his right drop foot and twisted around landing on his left hip area.  Pt was seen in ED and special tests were performed (-) for fractures. Pt was away at the time on vacation so followed up with ortho upon return and was referred to outpatient PT. 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   He notes that he does have some dull pain lingering however it is overall much improved. adls:  increased pain with walking fast,  aching in leg with adls with associated decreased mobility.   Pts goals:  eliminate left Hamstring pain and improve mobility.  He would also like to not trip anymore so would like his right foot drop addressed at some point including possible AFOs.      Patient/Family/Caregiver Comments/Observations Les arrived with no pain in the L hamstring but some mild 1-2/10 in L calf. He reports that he obtained the script from the  for the AFO if needed.     Patient reported fall since last visit No        Pain Assessment    Currently in pain Yes     Preferred Pain Scale number (Numeric Rating Pain Scale)     Pain Side/Orientation left     Pain: Body location Calf     Pain Rating (0-10): Pre Activity 2     Pain Rating (0-10): Post Activity 2        Pain Intervention    Intervention  manual therapy and exercise     Post Intervention Comments no change         Services    Do You Speak a Language Other Than English at Home? no                Daily Treatment Assessment and Plan - 10/20/23 0805        Daily Treatment Assessment and Plan    Progress toward goals Progressing     Daily Outcome Summary Les arrived with no pain in his L hamstring today. Mild discomfort noted in his L calf. He had a lot of questions regarding AFO, potential for improved function of R foot drop, good exercises to do in the pool with his wife. Recommended he discuss all of these issues with his primary PT. Asked him to not add anything on his own at this time. Patient with good understanding.     Plan and Recommendations Continue with POC per primary PT. Issue HEP                         Today's Treatment:    ORTHO PT FLOWSHEET    Exercises Current Session Time y/n   MODALITIES- HEAT/ICE  CPT 59469 TOTAL TIME FOR SESSION Not performed    Heat     Ice/Vasocompression     MODALITIES - E-STIM  CPT 75598   TOTAL TIME FOR SESSION Not performed    E-stim/H-Wave     MODALITIES - US TOTAL TIME FOR SESSION Not performed    US (CPT 84318)     Iontophoresis (CPT 28102)     Mechanical Traction     THER ACT  CPT 88130 TOTAL TIME FOR SESSION 10 minutes    Pain, falls, med changes completed y   Pt education Pt educated regarding IE results and POC and need for active participation in skilled PT  intervention and HEP.  Discussed with pt follow up with MD regarding possible right LE AFO to avoid further tripping and reinjury.  10/18/23 - reviewed possibility of AFO and pt shown normal hinged AFO, toe off and Spry step brace as options for dorsiflexion assistance in right LE to prevent further tripping/falling injuries.  Reviewed with pt heel toe gait pattern.  Also to discuss possibility of dorsiflexion assist brace vs/with trial of PT to improve mobility in right ankle as it is cause of left leg injury.        n   Transfer training     Body Mechanics/Work Training     THER EX  CPT 70934 TOTAL TIME FOR SESSION 35 minutes    CARDIOVASCULAR      Nu Step     Recumbent Bike seat #24 [place] x 10 minutes (1 mile distance) y   Elliptical     Treadmill     UBE     STRENGTHENING     Supine ther-ex       Bridges       Bilateral knee fall outs  Prone       HS curls   2 x 10 5 sec  2 x 10 5 sec    2 x 10   y  y    y   Seated ther-ex       LAQ      STS           Standing ther-ex       Hip flex       Hip ext       Hip abd       Hip add       STRETCHING     LE stretching       HS strap stretch       Piriformis KTOS       Figure 4 push away       Gastroc stretch   3 x 30 sec  3 x 30 sec  3 x 30 sec  3 x 30 sec R only   In supine Manual by therapist   y  y  y  y   Other stretching     REPEATED MOVEMENTS     NEURO RE-ED  CPT 64665 TOTAL TIME FOR SESSION Not performed    COORDINATION     POSTURAL RE-ED        PRE-GAIT ACTIVITIES      BALANCE TRAINING      Sitting balance     Standing balance     GAIT TRAINING  CPT 65482 TOTAL TIME FOR SESSION Not performed    Ambulation  With trial right AFO (script scanned in on 10/20)    Dynamic gait      Stair negotiation     Curb negotiation     Ramp negotiation     Outdoor ambulation     BWS/vector training     MANUAL  CPT 48710 TOTAL TIME FOR SESSION 10 minutes    Stretching        HS        Hip rotators        Hip flexors   3 x 20 sec  3 x 20 sec  3 x 20 sec   n  n  n    Mobilization      Massage DTM to left Medial HS pt prone y   Taping  prn    10/18/23  Emailed HEP to [email or web address]     HOME EXERCISE PROGRAM  Created by Christine Mulvihill  Oct 18th, 2023  View videos at [email or web address]    4 Exercises    HAMSTRING STRETCH WITH TOWEL    While lying down on your back, hook a towel or strap under your foot and draw up your leg until a stretch is felt along the backside of your leg.    Keep your knee in a straightened position during the stretch.    Video # [id number] Repeat 3 Times   Hold 30 Seconds   Complete 1 Set   Perform 2 Times a Day          QUADRICEPS STRETCH - SIDE LYING    Lie on your side with your target limb on top. Next, grab your target limb below the knee and pull your knee into a more bent position until a stretch is felt along the front of your thigh. Repeat 3 Times   Hold 30 Seconds   Complete 1 Set   Perform 2 Times a Day          SUPINE HIP EXTERNAL ROTATION STRETCH - MODIFIED NOAH OR FIGURE 4    While lying on your back with your leg crossed over your knee, use your hand and push the crossed knee away from you as shown.    Video # [id number] Repeat 3 Times   Hold 30 Seconds   Complete 1 Set   Perform 2 Times a Day          Piriformis Stretch    Lie on your back with your uninvolved leg bent and the ankle of your involved leg crossed over the top (as shown). Grab the knee of the involved leg with both hands and stretch toward your chest and then over in the direction of the opposite shoulder. Hold the stretch for 15 seconds, then relax.    Do 5 - 15 second stretches on each leg. Repeat 3 Times   Hold 30 Seconds   Perform 2 Times

## 2023-10-20 NOTE — OP PT TREATMENT LOG
ORTHO PT FLOWSHEET    Exercises Current Session Time y/n   MODALITIES- HEAT/ICE  CPT 36204 TOTAL TIME FOR SESSION Not performed    Heat     Ice/Vasocompression     MODALITIES - E-STIM  CPT 57365   TOTAL TIME FOR SESSION Not performed    E-stim/H-Wave     MODALITIES - US TOTAL TIME FOR SESSION Not performed    US (CPT 43894)     Iontophoresis (CPT 84033)     Mechanical Traction     THER ACT  CPT 74308 TOTAL TIME FOR SESSION 10 minutes    Pain, falls, med changes completed y   Pt education Pt educated regarding IE results and POC and need for active participation in skilled PT intervention and HEP.  Discussed with pt follow up with MD regarding possible right LE AFO to avoid further tripping and reinjury.  10/18/23 - reviewed possibility of AFO and pt shown normal hinged AFO, toe off and Spry step brace as options for dorsiflexion assistance in right LE to prevent further tripping/falling injuries.  Reviewed with pt heel toe gait pattern.  Also to discuss possibility of dorsiflexion assist brace vs/with trial of PT to improve mobility in right ankle as it is cause of left leg injury.        n   Transfer training     Body Mechanics/Work Training     THER EX  CPT 91259 TOTAL TIME FOR SESSION 35 minutes    CARDIOVASCULAR      Nu Step     Recumbent Bike seat #24 [place] x 10 minutes (1 mile distance) y   Elliptical     Treadmill     UBE     STRENGTHENING     Supine ther-ex       Bridges       Bilateral knee fall outs  Prone       HS curls   2 x 10 5 sec  2 x 10 5 sec    2 x 10   y  y    y   Seated ther-ex       LAQ      STS           Standing ther-ex       Hip flex       Hip ext       Hip abd       Hip add       STRETCHING     LE stretching       HS strap stretch       Piriformis KTOS       Figure 4 push away       Gastroc stretch   3 x 30 sec  3 x 30 sec  3 x 30 sec  3 x 30 sec R only   In supine Manual by therapist   y  y  y  y   Other stretching     REPEATED MOVEMENTS     NEURO RE-ED  CPT 17197 TOTAL TIME FOR  SESSION Not performed    COORDINATION     POSTURAL RE-ED        PRE-GAIT ACTIVITIES      BALANCE TRAINING      Sitting balance     Standing balance     GAIT TRAINING  CPT 49750 TOTAL TIME FOR SESSION Not performed    Ambulation  With trial right AFO (script scanned in on 10/20)    Dynamic gait      Stair negotiation     Curb negotiation     Ramp negotiation     Outdoor ambulation     BWS/vector training     MANUAL  CPT 86114 TOTAL TIME FOR SESSION 10 minutes    Stretching        HS        Hip rotators        Hip flexors   3 x 20 sec  3 x 20 sec  3 x 20 sec   n  n  n   Mobilization      Massage DTM to left Medial HS pt prone y   Taping  prn    10/18/23  Emailed HEP to [email or web address]     HOME EXERCISE PROGRAM  Created by Christine Mulvihill  Oct 18th, 2023  View videos at [email or web address]    4 Exercises    HAMSTRING STRETCH WITH TOWEL    While lying down on your back, hook a towel or strap under your foot and draw up your leg until a stretch is felt along the backside of your leg.    Keep your knee in a straightened position during the stretch.    Video # [id number] Repeat 3 Times   Hold 30 Seconds   Complete 1 Set   Perform 2 Times a Day          QUADRICEPS STRETCH - SIDE LYING    Lie on your side with your target limb on top. Next, grab your target limb below the knee and pull your knee into a more bent position until a stretch is felt along the front of your thigh. Repeat 3 Times   Hold 30 Seconds   Complete 1 Set   Perform 2 Times a Day          SUPINE HIP EXTERNAL ROTATION STRETCH - MODIFIED NOAH OR FIGURE 4    While lying on your back with your leg crossed over your knee, use your hand and push the crossed knee away from you as shown.    Video # [id number] Repeat 3 Times   Hold 30 Seconds   Complete 1 Set   Perform 2 Times a Day          Piriformis Stretch    Lie on your back with your uninvolved leg bent and the ankle of your involved leg crossed over the top (as shown). Grab the knee of the involved leg with  both hands and stretch toward your chest and then over in the direction of the opposite shoulder. Hold the stretch for 15 seconds, then relax.    Do 5 - 15 second stretches on each leg. Repeat 3 Times   Hold 30 Seconds   Perform 2 Times

## 2023-10-24 ENCOUNTER — HOSPITAL ENCOUNTER (OUTPATIENT)
Dept: PHYSICAL THERAPY | Facility: REHABILITATION | Age: 78
Setting detail: THERAPIES SERIES
Discharge: HOME | End: 2023-10-24
Attending: FAMILY MEDICINE
Payer: MEDICARE

## 2023-10-24 DIAGNOSIS — S70.12XA CONTUSION OF LEFT THIGH, INITIAL ENCOUNTER: Primary | ICD-10-CM

## 2023-10-24 PROCEDURE — 97110 THERAPEUTIC EXERCISES: CPT | Mod: GP

## 2023-10-24 PROCEDURE — 97140 MANUAL THERAPY 1/> REGIONS: CPT | Mod: GP

## 2023-10-24 NOTE — PROGRESS NOTES
Physical Therapy Visit    PT DAILY NOTE FOR OUTPATIENT THERAPY    Patient: Les Garcia MRN: [id number]  : 1945 78 y.o.  Referring Physician: Eduardo Virk MD  Date of Visit: 10/24/2023    Certification Dates: 10/11/23 through 24    Diagnosis:   1. Contusion of left thigh, initial encounter        Chief Complaints:  pain and decreased adl functioning    Precautions:   Existing Precautions/Restrictions: cardiac  Precautions comments: MI, cardiac stent, right foot drop      TODAY'S VISIT    Time In Session:  Start Time: 903  Stop Time: 1000  Time Calculation (min): 57 min   History/Vitals/Pain/Encounter Info - 10/24/23 09        Injury History/Precautions/Daily Required Info    Document Type daily treatment     Primary Therapist Christine Mulvihill, PT     Chief Complaint/Reason for Visit  pain and decreased adl functioning     Onset of Illness/Injury or Date of Surgery 23     Referring Physician Eduardo Virk MD     Existing Precautions/Restrictions cardiac     Precautions comments MI, cardiac stent, right foot drop     History of present illness/functional impairment On  tripped on his right drop foot and twisted around landing on his left hip area.  Pt was seen in ED and special tests were performed (-) for fractures. Pt was away at the time on vacation so followed up with ortho upon return and was referred to outpatient PT. 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   He notes that he does have some dull pain lingering however it is overall much improved. adls:  increased pain with walking fast,  aching in leg with adls with associated decreased mobility.   Pts goals:  eliminate left Hamstring pain and improve mobility.  He would also like to not trip anymore so would like his right foot drop addressed at some point including possible AFOs.      Patient/Family/Caregiver Comments/Observations Reports feeling pretty good today, denies pain. Reiterates goal of working on R foot drop and getting AFO/device to assist with it.     Patient reported fall since last visit No        Pain Assessment    Currently in pain No/Denies         Services    Do You Speak a Language Other Than English at Home? --                Daily Treatment Assessment and Plan - 10/24/23 1144        Daily Treatment Assessment and Plan    Progress toward goals Progressing     Daily Outcome Summary Arrived without pain but did have further questions about improving foot drop with exercise or possibly Estim, as well as reiterated desire to consider AFO/bracing options to limit impact of foot drop and decrease falls. Pt educated primary PT had been working on orthotist consult and would discuss Friday. Otherwise responded well to program and progressions today and would benefit from continued stretching and progressions for strengthening as tolerated.     Plan and Recommendations Discuss orthotist consult or bracing options, progress strengthening as tolerated. Message sent to primary PT re: pt bracing questions.                     OBJECTIVE DATA TAKEN TODAY:    None taken    Today's Treatment:    ORTHO PT FLOWSHEET    Exercises Current Session Time y/n   MODALITIES- HEAT/ICE  CPT 99581 TOTAL TIME FOR SESSION Not performed    Heat     Ice/Vasocompression     MODALITIES - E-STIM  CPT 61854   TOTAL TIME FOR SESSION Not performed    E-stim/H-Wave     MODALITIES - US TOTAL TIME FOR SESSION Not performed    US (CPT 59317)     Iontophoresis (CPT 62789)     Mechanical Traction     THER ACT  CPT 43349 TOTAL TIME FOR SESSION Not performed    Pain, falls, med changes completed y   Pt education Pt educated regarding IE results and POC and need for active participation in skilled PT intervention and HEP.  Discussed with pt follow up with MD regarding possible right LE AFO to avoid further  tripping and reinjury.  10/18/23 - reviewed possibility of AFO and pt shown normal hinged AFO, toe off and Spry step brace as options for dorsiflexion assistance in right LE to prevent further tripping/falling injuries.  Reviewed with pt heel toe gait pattern.  Also to discuss possibility of dorsiflexion assist brace vs/with trial of PT to improve mobility in right ankle as it is cause of left leg injury.        n   Transfer training     Body Mechanics/Work Training     THER EX  CPT 89950 TOTAL TIME FOR SESSION 45 minutes    CARDIOVASCULAR      Nu Step     Recumbent Bike seat #24 [place] dash x 10 minutes (1 mile distance) y   Elliptical     Treadmill     UBE     STRENGTHENING     Supine ther-ex       Bridges       Bilateral knee fall outs       Ankle DF ecc control  Prone       HS curls   2 x 10 5 sec  2 x 10 5 sec, green band around knees  1 x 5, manual resistance    3 x 10   y  y  y    y   Seated ther-ex       LAQ      STS           Standing ther-ex       Hip flex       Hip ext       Hip abd       Hip add       STRETCHING     LE stretching       HS strap stretch       Piriformis KTOS       Figure 4 push away       Gastroc stretch   3 x 30 sec  3 x 30 sec  3 x 30 sec  3 x 30 sec R only   In supine Manual by therapist - discussed ankle bracing options and messaging primary PT re: orthotist appt during stretching   n  y  y  y   Other stretching     REPEATED MOVEMENTS     NEURO RE-ED  CPT 40118 TOTAL TIME FOR SESSION Not performed    COORDINATION     POSTURAL RE-ED        PRE-GAIT ACTIVITIES      BALANCE TRAINING      Sitting balance     Standing balance     GAIT TRAINING  CPT 64676 TOTAL TIME FOR SESSION Not performed    Ambulation  With trial right AFO (script scanned in on 10/20)    Dynamic gait      Stair negotiation     Curb negotiation     Ramp negotiation     Outdoor ambulation     BWS/vector training     MANUAL  CPT 88902 TOTAL TIME FOR SESSION 10 minutes    Stretching        HS        Hip rotators        Hip  flexors   3 x 20 sec  3 x 20 sec  3 x 20 sec   y  n  n   Mobilization      Massage DTM to left Medial HS pt prone y   Taping  prn    10/18/23  Emailed HEP to [email or web address]     HOME EXERCISE PROGRAM  Created by Christine Mulvihill  Oct 18th, 2023  View videos at [email or web address]    4 Exercises    HAMSTRING STRETCH WITH TOWEL    While lying down on your back, hook a towel or strap under your foot and draw up your leg until a stretch is felt along the backside of your leg.    Keep your knee in a straightened position during the stretch.    Video # [id number] Repeat 3 Times   Hold 30 Seconds   Complete 1 Set   Perform 2 Times a Day          QUADRICEPS STRETCH - SIDE LYING    Lie on your side with your target limb on top. Next, grab your target limb below the knee and pull your knee into a more bent position until a stretch is felt along the front of your thigh. Repeat 3 Times   Hold 30 Seconds   Complete 1 Set   Perform 2 Times a Day          SUPINE HIP EXTERNAL ROTATION STRETCH - MODIFIED NOAH OR FIGURE 4    While lying on your back with your leg crossed over your knee, use your hand and push the crossed knee away from you as shown.    Video # [id number] Repeat 3 Times   Hold 30 Seconds   Complete 1 Set   Perform 2 Times a Day          Piriformis Stretch    Lie on your back with your uninvolved leg bent and the ankle of your involved leg crossed over the top (as shown). Grab the knee of the involved leg with both hands and stretch toward your chest and then over in the direction of the opposite shoulder. Hold the stretch for 15 seconds, then relax.    Do 5 - 15 second stretches on each leg. Repeat 3 Times   Hold 30 Seconds   Perform 2 Times

## 2023-10-24 NOTE — OP PT TREATMENT LOG
ORTHO PT FLOWSHEET    Exercises Current Session Time y/n   MODALITIES- HEAT/ICE  CPT 91429 TOTAL TIME FOR SESSION Not performed    Heat     Ice/Vasocompression     MODALITIES - E-STIM  CPT 19969   TOTAL TIME FOR SESSION Not performed    E-stim/H-Wave     MODALITIES - US TOTAL TIME FOR SESSION Not performed    US (CPT 61901)     Iontophoresis (CPT 08327)     Mechanical Traction     THER ACT  CPT 77285 TOTAL TIME FOR SESSION Not performed    Pain, falls, med changes completed y   Pt education Pt educated regarding IE results and POC and need for active participation in skilled PT intervention and HEP.  Discussed with pt follow up with MD regarding possible right LE AFO to avoid further tripping and reinjury.  10/18/23 - reviewed possibility of AFO and pt shown normal hinged AFO, toe off and Spry step brace as options for dorsiflexion assistance in right LE to prevent further tripping/falling injuries.  Reviewed with pt heel toe gait pattern.  Also to discuss possibility of dorsiflexion assist brace vs/with trial of PT to improve mobility in right ankle as it is cause of left leg injury.        n   Transfer training     Body Mechanics/Work Training     THER EX  CPT 16376 TOTAL TIME FOR SESSION 45 minutes    CARDIOVASCULAR      Nu Step     Recumbent Bike seat #24 [place] dash x 10 minutes (1 mile distance) y   Elliptical     Treadmill     UBE     STRENGTHENING     Supine ther-ex       Bridges       Bilateral knee fall outs       Ankle DF ecc control  Prone       HS curls   2 x 10 5 sec  2 x 10 5 sec, green band around knees  1 x 5, manual resistance    3 x 10   y  y  y    y   Seated ther-ex       LAQ      STS           Standing ther-ex       Hip flex       Hip ext       Hip abd       Hip add       STRETCHING     LE stretching       HS strap stretch       Piriformis KTOS       Figure 4 push away       Gastroc stretch   3 x 30 sec  3 x 30 sec  3 x 30 sec  3 x 30 sec R only   In supine Manual by therapist - discussed ankle  bracing options and messaging primary PT re: orthotist appt during stretching   n  y  y  y   Other stretching     REPEATED MOVEMENTS     NEURO RE-ED  CPT 90880 TOTAL TIME FOR SESSION Not performed    COORDINATION     POSTURAL RE-ED        PRE-GAIT ACTIVITIES      BALANCE TRAINING      Sitting balance     Standing balance     GAIT TRAINING  CPT 74557 TOTAL TIME FOR SESSION Not performed    Ambulation  With trial right AFO (script scanned in on 10/20)    Dynamic gait      Stair negotiation     Curb negotiation     Ramp negotiation     Outdoor ambulation     BWS/vector training     MANUAL  CPT 14167 TOTAL TIME FOR SESSION 10 minutes    Stretching        HS        Hip rotators        Hip flexors   3 x 20 sec  3 x 20 sec  3 x 20 sec   y  n  n   Mobilization      Massage DTM to left Medial HS pt prone y   Taping  prn    10/18/23  Emailed HEP to [email or web address]     HOME EXERCISE PROGRAM  Created by Christine Mulvihill  Oct 18th, 2023  View videos at [email or web address]    4 Exercises    HAMSTRING STRETCH WITH TOWEL    While lying down on your back, hook a towel or strap under your foot and draw up your leg until a stretch is felt along the backside of your leg.    Keep your knee in a straightened position during the stretch.    Video # [id number] Repeat 3 Times   Hold 30 Seconds   Complete 1 Set   Perform 2 Times a Day          QUADRICEPS STRETCH - SIDE LYING    Lie on your side with your target limb on top. Next, grab your target limb below the knee and pull your knee into a more bent position until a stretch is felt along the front of your thigh. Repeat 3 Times   Hold 30 Seconds   Complete 1 Set   Perform 2 Times a Day          SUPINE HIP EXTERNAL ROTATION STRETCH - MODIFIED NOAH OR FIGURE 4    While lying on your back with your leg crossed over your knee, use your hand and push the crossed knee away from you as shown.    Video # [id number] Repeat 3 Times   Hold 30 Seconds   Complete 1 Set   Perform 2 Times a Day           Piriformis Stretch    Lie on your back with your uninvolved leg bent and the ankle of your involved leg crossed over the top (as shown). Grab the knee of the involved leg with both hands and stretch toward your chest and then over in the direction of the opposite shoulder. Hold the stretch for 15 seconds, then relax.    Do 5 - 15 second stretches on each leg. Repeat 3 Times   Hold 30 Seconds   Perform 2 Times

## 2023-10-27 ENCOUNTER — HOSPITAL ENCOUNTER (OUTPATIENT)
Dept: PHYSICAL THERAPY | Facility: REHABILITATION | Age: 78
Setting detail: THERAPIES SERIES
Discharge: HOME | End: 2023-10-27
Attending: FAMILY MEDICINE
Payer: MEDICARE

## 2023-10-27 DIAGNOSIS — S70.12XA CONTUSION OF LEFT THIGH, INITIAL ENCOUNTER: Primary | ICD-10-CM

## 2023-10-27 PROCEDURE — 97140 MANUAL THERAPY 1/> REGIONS: CPT | Mod: GP

## 2023-10-27 PROCEDURE — 97110 THERAPEUTIC EXERCISES: CPT | Mod: GP

## 2023-10-27 NOTE — OP PT TREATMENT LOG
ORTHO PT FLOWSHEET    Exercises Current Session Time y/n   MODALITIES- HEAT/ICE  CPT 87361 TOTAL TIME FOR SESSION Not performed    Heat     Ice/Vasocompression     MODALITIES - E-STIM  CPT 30109   TOTAL TIME FOR SESSION Not performed    E-stim/H-Wave     MODALITIES - US TOTAL TIME FOR SESSION Not performed    US (CPT 20970)     Iontophoresis (CPT 30857)     Mechanical Traction     THER ACT  CPT 55652 TOTAL TIME FOR SESSION Not performed    Pain, falls, med changes completed y   Pt education Pt educated regarding IE results and POC and need for active participation in skilled PT intervention and HEP.  Discussed with pt follow up with MD regarding possible right LE AFO to avoid further tripping and reinjury.  10/18/23 - reviewed possibility of AFO and pt shown normal hinged AFO, toe off and Spry step brace as options for dorsiflexion assistance in right LE to prevent further tripping/falling injuries.  Reviewed with pt heel toe gait pattern.  Also to discuss possibility of dorsiflexion assist brace vs/with trial of PT to improve mobility in right ankle as it is cause of left leg injury.        n   Transfer training     Body Mechanics/Work Training     THER EX  CPT 92073 TOTAL TIME FOR SESSION 45 minutes    CARDIOVASCULAR      Nu Step     Recumbent Bike seat #24 [place] dash x 10 minutes (1 mile distance) y   Elliptical     Treadmill     UBE     STRENGTHENING     Supine ther-ex       Bridges       Bilateral knee fall outs       Ankle DF ecc control  Prone       HS curls   2 x 10 5 sec  2 x 10 5 sec, green band around knees  1 x 5, manual resistance    3 x 10   y  y  y    y   Seated ther-ex       LAQ      STS           Standing ther-ex       Hip flex       Hip ext       Hip abd       Hip add       STRETCHING     LE stretching       HS strap stretch       Piriformis KTOS       Figure 4 push away       Gastroc stretch   3 x 30 sec  3 x 30 sec  3 x 30 sec  3 x 30 sec R only   In supine Manual by therapist - discussed ankle  bracing options and messaging primary PT re: orthotist appt during stretching   n  y  y  y   Other stretching     REPEATED MOVEMENTS     NEURO RE-ED  CPT 53842 TOTAL TIME FOR SESSION Not performed    COORDINATION     POSTURAL RE-ED        PRE-GAIT ACTIVITIES      BALANCE TRAINING      Sitting balance     Standing balance     GAIT TRAINING  CPT 24946 TOTAL TIME FOR SESSION Not performed    Ambulation  With trial right AFO (script scanned in on 10/20)    Dynamic gait      Stair negotiation     Curb negotiation     Ramp negotiation     Outdoor ambulation     BWS/vector training     MANUAL  CPT 40637 TOTAL TIME FOR SESSION 10 minutes    Stretching        HS        Hip rotators        Hip flexors   3 x 20 sec  3 x 20 sec  3 x 20 sec   y  n  n   Mobilization      Massage DTM to left Medial HS pt prone y   Taping  prn    10/18/23  Emailed HEP to [email or web address]     HOME EXERCISE PROGRAM  Created by Christine Mulvihill  Oct 18th, 2023  View videos at [email or web address]    4 Exercises    HAMSTRING STRETCH WITH TOWEL    While lying down on your back, hook a towel or strap under your foot and draw up your leg until a stretch is felt along the backside of your leg.    Keep your knee in a straightened position during the stretch.    Video # [id number] Repeat 3 Times   Hold 30 Seconds   Complete 1 Set   Perform 2 Times a Day          QUADRICEPS STRETCH - SIDE LYING    Lie on your side with your target limb on top. Next, grab your target limb below the knee and pull your knee into a more bent position until a stretch is felt along the front of your thigh. Repeat 3 Times   Hold 30 Seconds   Complete 1 Set   Perform 2 Times a Day          SUPINE HIP EXTERNAL ROTATION STRETCH - MODIFIED NOAH OR FIGURE 4    While lying on your back with your leg crossed over your knee, use your hand and push the crossed knee away from you as shown.    Video # [id number] Repeat 3 Times   Hold 30 Seconds   Complete 1 Set   Perform 2 Times a Day           Piriformis Stretch    Lie on your back with your uninvolved leg bent and the ankle of your involved leg crossed over the top (as shown). Grab the knee of the involved leg with both hands and stretch toward your chest and then over in the direction of the opposite shoulder. Hold the stretch for 15 seconds, then relax.    Do 5 - 15 second stretches on each leg. Repeat 3 Times   Hold 30 Seconds   Perform 2 Times

## 2023-10-27 NOTE — PROGRESS NOTES
Physical Therapy Visit    PT DAILY NOTE FOR OUTPATIENT THERAPY    Patient: Les Garcia MRN: [id number]  : 1945 78 y.o.  Referring Physician: Eduardo Virk MD  Date of Visit: 10/27/2023    Certification Dates: 10/11/23 through 24    Diagnosis:   1. Contusion of left thigh, initial encounter        Chief Complaints:  pain and decreased adl functioning    Precautions:   Existing Precautions/Restrictions: cardiac  Precautions comments: MI, cardiac stent, right foot drop      TODAY'S VISIT    Time In Session:  Start Time: 0500  Stop Time: 1100  Time Calculation (min): 360 min   History/Vitals/Pain/Encounter Info - 10/27/23 1007        Injury History/Precautions/Daily Required Info    Document Type daily treatment     Primary Therapist Christine Mulvihill, PT     Chief Complaint/Reason for Visit  pain and decreased adl functioning     Onset of Illness/Injury or Date of Surgery 23     Referring Physician Eduardo Virk MD     Existing Precautions/Restrictions cardiac     Precautions comments MI, cardiac stent, right foot drop     History of present illness/functional impairment On  tripped on his right drop foot and twisted around landing on his left hip area.  Pt was seen in ED and special tests were performed (-) for fractures. Pt was away at the time on vacation so followed up with ortho upon return and was referred to outpatient PT. 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   He notes that he does have some dull pain lingering however it is overall much improved. adls:  increased pain with walking fast,  aching in leg with adls with associated decreased mobility.   Pts goals:  eliminate left Hamstring pain and improve mobility.  He would also like to not trip anymore so would like his right foot drop addressed at some point including possible AFOs.      "Patient/Family/Caregiver Comments/Observations Notes that his left thigh has been \"ok\".  Had a little \"something\" yesterday while sitting on a hard chair.     Patient reported fall since last visit No        Pain Assessment    Currently in pain No/Denies        Pain Intervention    Intervention  MT TE     Post Intervention Comments overall decreased posterior left thigh pain                Daily Treatment Assessment and Plan - 10/27/23 1040        Daily Treatment Assessment and Plan    Progress toward goals Progressing     Daily Outcome Summary Les presented with minimal spasm in his left HS today and it was reduced to nil after treatment.  He will benfit from assessment of right ankle per MD script next visit and assessment by orthotist for AFO     Plan and Recommendations Contact Prosthetist regarding right ankle brace options.  Reeval at next visit to add right foot drop to treatment program.                     OBJECTIVE DATA TAKEN TODAY:    None taken    Today's Treatment:    ORTHO PT FLOWSHEET    Exercises Current Session Time y/n   MODALITIES- HEAT/ICE  CPT 50119 TOTAL TIME FOR SESSION Not performed    Heat     Ice/Vasocompression     MODALITIES - E-STIM  CPT 89556   TOTAL TIME FOR SESSION Not performed    E-stim/H-Wave     MODALITIES - US TOTAL TIME FOR SESSION Not performed    US (CPT 72281)     Iontophoresis (CPT 99807)     Mechanical Traction     THER ACT  CPT 83901 TOTAL TIME FOR SESSION Not performed    Pain, falls, med changes completed y   Pt education Pt educated regarding IE results and POC and need for active participation in skilled PT intervention and HEP.  Discussed with pt follow up with MD regarding possible right LE AFO to avoid further tripping and reinjury.  10/18/23 - reviewed possibility of AFO and pt shown normal hinged AFO, toe off and Spry step brace as options for dorsiflexion assistance in right LE to prevent further tripping/falling injuries.  Reviewed with pt heel toe gait " pattern.  Also to discuss possibility of dorsiflexion assist brace vs/with trial of PT to improve mobility in right ankle as it is cause of left leg injury.        n   Transfer training     Body Mechanics/Work Training     THER EX  CPT 08769 TOTAL TIME FOR SESSION 45 minutes    CARDIOVASCULAR      Nu Step     Recumbent Bike seat #24 [place] dash x 10 minutes (1 mile distance) Y   Elliptical     Treadmill     UBE     STRENGTHENING     Supine ther-ex       PB    Bridges       Bilateral knee fall outs       Ankle DF ecc control       Marching with TB  Prone       HS curls   2 x 10 5 sec     Added PB today  2 x 10 5 sec, Green band around knees  1 x 10, manual resistance    Right  Left LE   2/10   PTB  3 x 10   Y  Y    Y    y   Seated ther-ex       LAQ      STS      HS curls focus eccentric            Y   Standing ther-ex       Hip flex       Hip ext       Hip abd       Hip add       STRETCHING     LE stretching       HS strap stretch       Piriformis KTOS       Figure 4 push away       Gastroc stretch   3 x 30 sec  3 x 30 sec  3 x 30 sec  3 x 30 sec R only   In supine Manual by therapist - discussed ankle bracing options and messaging primary PT re: orthotist appt during stretching   n         Other stretching     REPEATED MOVEMENTS     NEURO RE-ED  CPT 81048 TOTAL TIME FOR SESSION Not performed    COORDINATION     POSTURAL RE-ED        PRE-GAIT ACTIVITIES      BALANCE TRAINING      Sitting balance     Standing balance     GAIT TRAINING  CPT 81666 TOTAL TIME FOR SESSION Not performed    Ambulation  With trial right AFO (script scanned in on 10/20)    Dynamic gait      Stair negotiation     Curb negotiation     Ramp negotiation     Outdoor ambulation     BWS/vector training     MANUAL  CPT 31785 TOTAL TIME FOR SESSION 10 minutes    Stretching        HS        Hip rotators        Hip flexors   3 x 20 sec  3 x 20 sec  3 x 20 sec   Y  Y  n   Mobilization      Massage DTM to left Medial HS pt prone y   Taping  prn     10/18/23  Emailed [facility] to [email or web address]     HOME EXERCISE PROGRAM  Created by Christine Mulvihill  Oct 18th, 2023  View videos at [email or web address]    4 Exercises    HAMSTRING STRETCH WITH TOWEL    While lying down on your back, hook a towel or strap under your foot and draw up your leg until a stretch is felt along the backside of your leg.    Keep your knee in a straightened position during the stretch.    Video # [id number] Repeat 3 Times   Hold 30 Seconds   Complete 1 Set   Perform 2 Times a Day          QUADRICEPS STRETCH - SIDE LYING    Lie on your side with your target limb on top. Next, grab your target limb below the knee and pull your knee into a more bent position until a stretch is felt along the front of your thigh. Repeat 3 Times   Hold 30 Seconds   Complete 1 Set   Perform 2 Times a Day          SUPINE HIP EXTERNAL ROTATION STRETCH - MODIFIED NOAH OR FIGURE 4    While lying on your back with your leg crossed over your knee, use your hand and push the crossed knee away from you as shown.    Video # [id number] Repeat 3 Times   Hold 30 Seconds   Complete 1 Set   Perform 2 Times a Day          Piriformis Stretch    Lie on your back with your uninvolved leg bent and the ankle of your involved leg crossed over the top (as shown). Grab the knee of the involved leg with both hands and stretch toward your chest and then over in the direction of the opposite shoulder. Hold the stretch for 15 seconds, then relax.    Do 5 - 15 second stretches on each leg. Repeat 3 Times   Hold 30 Seconds   Perform 2 Times

## 2023-10-27 NOTE — OP PT TREATMENT LOG
ORTHO PT FLOWSHEET    Exercises Current Session Time y/n   MODALITIES- HEAT/ICE  CPT 52164 TOTAL TIME FOR SESSION Not performed    Heat     Ice/Vasocompression     MODALITIES - E-STIM  CPT 17863   TOTAL TIME FOR SESSION Not performed    E-stim/H-Wave     MODALITIES - US TOTAL TIME FOR SESSION Not performed    US (CPT 98627)     Iontophoresis (CPT 97015)     Mechanical Traction     THER ACT  CPT 85055 TOTAL TIME FOR SESSION Not performed    Pain, falls, med changes completed y   Pt education Pt educated regarding IE results and POC and need for active participation in skilled PT intervention and HEP.  Discussed with pt follow up with MD regarding possible right LE AFO to avoid further tripping and reinjury.  10/18/23 - reviewed possibility of AFO and pt shown normal hinged AFO, toe off and Spry step brace as options for dorsiflexion assistance in right LE to prevent further tripping/falling injuries.  Reviewed with pt heel toe gait pattern.  Also to discuss possibility of dorsiflexion assist brace vs/with trial of PT to improve mobility in right ankle as it is cause of left leg injury.        n   Transfer training     Body Mechanics/Work Training     THER EX  CPT 81165 TOTAL TIME FOR SESSION 45 minutes    CARDIOVASCULAR      Nu Step     Recumbent Bike seat #24 [place] dash x 10 minutes (1 mile distance) Y   Elliptical     Treadmill     UBE     STRENGTHENING     Supine ther-ex       PB    Bridges       Bilateral knee fall outs       Ankle DF ecc control       Marching with TB  Prone       HS curls   2 x 10 5 sec     Added PB today  2 x 10 5 sec, Green band around knees  1 x 10, manual resistance    Right  Left LE   2/10   PTB  3 x 10   Y  Y    Y    y   Seated ther-ex       LAQ      STS      HS curls focus eccentric            Y   Standing ther-ex       Hip flex       Hip ext       Hip abd       Hip add       STRETCHING     LE stretching       HS strap stretch       Piriformis KTOS       Figure 4 push away        Gastroc stretch   3 x 30 sec  3 x 30 sec  3 x 30 sec  3 x 30 sec R only   In supine Manual by therapist - discussed ankle bracing options and messaging primary PT re: orthotist appt during stretching   n         Other stretching     REPEATED MOVEMENTS     NEURO RE-ED  CPT 45187 TOTAL TIME FOR SESSION Not performed    COORDINATION     POSTURAL RE-ED        PRE-GAIT ACTIVITIES      BALANCE TRAINING      Sitting balance     Standing balance     GAIT TRAINING  CPT 78820 TOTAL TIME FOR SESSION Not performed    Ambulation  With trial right AFO (script scanned in on 10/20)    Dynamic gait      Stair negotiation     Curb negotiation     Ramp negotiation     Outdoor ambulation     BWS/vector training     MANUAL  CPT 22941 TOTAL TIME FOR SESSION 10 minutes    Stretching        HS        Hip rotators        Hip flexors   3 x 20 sec  3 x 20 sec  3 x 20 sec   Y  Y  n   Mobilization      Massage DTM to left Medial HS pt prone y   Taping  prn    10/18/23  Emailed HEP to [email or web address]     HOME EXERCISE PROGRAM  Created by Christine Mulvihill  Oct 18th, 2023  View videos at [email or web address]    4 Exercises    HAMSTRING STRETCH WITH TOWEL    While lying down on your back, hook a towel or strap under your foot and draw up your leg until a stretch is felt along the backside of your leg.    Keep your knee in a straightened position during the stretch.    Video # [id number] Repeat 3 Times   Hold 30 Seconds   Complete 1 Set   Perform 2 Times a Day          QUADRICEPS STRETCH - SIDE LYING    Lie on your side with your target limb on top. Next, grab your target limb below the knee and pull your knee into a more bent position until a stretch is felt along the front of your thigh. Repeat 3 Times   Hold 30 Seconds   Complete 1 Set   Perform 2 Times a Day          SUPINE HIP EXTERNAL ROTATION STRETCH - MODIFIED NOAH OR FIGURE 4    While lying on your back with your leg crossed over your knee, use your hand and push the crossed knee away from you  as shown.    Video # [id number] Repeat 3 Times   Hold 30 Seconds   Complete 1 Set   Perform 2 Times a Day          Piriformis Stretch    Lie on your back with your uninvolved leg bent and the ankle of your involved leg crossed over the top (as shown). Grab the knee of the involved leg with both hands and stretch toward your chest and then over in the direction of the opposite shoulder. Hold the stretch for 15 seconds, then relax.    Do 5 - 15 second stretches on each leg. Repeat 3 Times   Hold 30 Seconds   Perform 2 Times

## 2023-10-30 ENCOUNTER — HOSPITAL ENCOUNTER (OUTPATIENT)
Dept: PHYSICAL THERAPY | Facility: REHABILITATION | Age: 78
Setting detail: THERAPIES SERIES
Discharge: HOME | End: 2023-10-30
Attending: FAMILY MEDICINE
Payer: MEDICARE

## 2023-10-30 DIAGNOSIS — R26.2 DIFFICULTY IN WALKING: ICD-10-CM

## 2023-10-30 DIAGNOSIS — S70.12XA CONTUSION OF LEFT THIGH, INITIAL ENCOUNTER: Primary | ICD-10-CM

## 2023-10-30 DIAGNOSIS — M21.371 RIGHT FOOT DROP: ICD-10-CM

## 2023-10-30 PROCEDURE — 97164 PT RE-EVAL EST PLAN CARE: CPT | Mod: GP

## 2023-10-30 PROCEDURE — 97530 THERAPEUTIC ACTIVITIES: CPT | Mod: GP

## 2023-10-30 PROCEDURE — 97110 THERAPEUTIC EXERCISES: CPT | Mod: GP

## 2023-10-30 NOTE — OP PT TREATMENT LOG
ORTHO PT FLOWSHEET    Exercises Current Session Time y/n   MODALITIES- HEAT/ICE  CPT 55270 TOTAL TIME FOR SESSION Not performed    Heat     Ice/Vasocompression     MODALITIES - E-STIM  CPT 29188   TOTAL TIME FOR SESSION Not performed    E-stim/H-Wave     MODALITIES - US TOTAL TIME FOR SESSION Not performed    US (CPT 93296)     Iontophoresis (CPT 39493)     Mechanical Traction     THER ACT  CPT 07738 TOTAL TIME FOR SESSION 20 min    Pain, falls, med changes completed y   Pt education Pt educated regarding IE results and POC and need for active participation in skilled PT intervention and HEP.  Discussed with pt follow up with MD regarding possible right LE AFO to avoid further tripping and reinjury.  10/18/23 - reviewed possibility of AFO and pt shown normal hinged AFO, toe off and Spry step brace as options for dorsiflexion assistance in right LE to prevent further tripping/falling injuries.  Reviewed with pt heel toe gait pattern.  Also to discuss possibility of dorsiflexion assist brace vs/with trial of PT to improve mobility in right ankle as it is cause of left leg injury.   10/30/23 - pt education re:  Addition of right ankle to POC.  Instructed in HEP for right ankle.  See TE section.  Pt also educated in the use of his home EMS unit for contraction of anterior tibialis.  Set for 10 sec on/20 seconds off, 50 pps, 350 width.  Pt relayed understanding of set up and need to remove if burning or abnormal pain occurs       n          Y   Assessment 10/30/23 - Reevaluation performed to add right foot drop/deviated gait to plan of care.  Results of right ankle evaluation as well as plan discussed with pt as well as need to monitor progress closely due to long nature of involvement of right ankle.  Also discussed improvement in left thigh and meeting with orthotist Mani Gabriel in future for assessment for possible AFO.  Pt agreeable to this and confirmed desire to meet with orthotist.      Body Mechanics/Work Training      THER EX  CPT 22941 TOTAL TIME FOR SESSION 25 minutes    CARDIOVASCULAR      Nu Step     Recumbent Bike seat #24 [place] dash x 10 minutes (1 mile distance) Y   Elliptical     Treadmill     HEP 10/30/23 - Pt instructed in a primary HEP for right ankle including ankle dorsiflexion/plantarflexion/inv/ev, calf towel stretch for gastroc and soleus and wall calf stretch.  See chart copy of HEP2Go sheet as issued to pt. Y   STRENGTHENING     Supine ther-ex       PB    Bridges       Bilateral knee fall outs       Ankle DF ecc control       Marching with TB  Prone       HS curls   2 x 10 5 sec     Added PB today  2 x 10 5 sec, Green band around knees  1 x 10, manual resistance    Right  Left LE   2/10   PTB  3 x 10                Seated ther-ex       LAQ      STS      HS curls focus eccentric               Standing ther-ex       Hip flex       Hip ext       Hip abd       Hip add       STRETCHING     LE stretching       HS strap stretch       Piriformis KTOS       Figure 4 push away       Gastroc stretch   3 x 30 sec  3 x 30 sec  3 x 30 sec  3 x 30 sec R only   In supine Manual by therapist - discussed ankle bracing options and messaging primary PT re: orthotist appt during stretching   n         Other stretching     REPEATED MOVEMENTS     NEURO RE-ED  CPT 20302 TOTAL TIME FOR SESSION Not performed    COORDINATION     POSTURAL RE-ED        PRE-GAIT ACTIVITIES      BALANCE TRAINING      Sitting balance     Standing balance     GAIT TRAINING  CPT 76130 TOTAL TIME FOR SESSION Not performed    Ambulation  With trial right AFO (script scanned in on 10/20)    Dynamic gait      Stair negotiation     Curb negotiation     Ramp negotiation     Outdoor ambulation     BWS/vector training     MANUAL  CPT 74789 TOTAL TIME FOR SESSION     Stretching        HS        Hip rotators        Hip flexors   3 x 20 sec  3 x 20 sec  3 x 20 sec       n   Mobilization      Massage DTM to left Medial HS pt prone    Taping  prn      10/30/23 -           HOME EXERCISE PROGRAM  Created by Christine Mulvihill  Oct 30th, 2023  View videos at [email or web address]    5 Exercises    CALF STRETCH WITH TOWEL - GASTROCNEMIUS    While in a seated position, place a towel around the ball of your foot and pull your ankle back until a stretch is felt on your calf area.    Keep your knee in a straightened position during the stretch.    Video # [id number] Repeat 3 Times   Hold 20 Seconds   Complete 1 Set   Perform 2 Times a Day          CALF STRETCH WITH TOWEL - SOLEUS    While in a seated position, place a towel around the ball of your foot and pull your ankle back until a stretch is felt on your calf area.    Keep your knee in a bent position during the stretch.    Video # [id number] Repeat 3 Times   Hold 20 Seconds   Complete 1 Set   Perform 2 Times a Day          ANKLE PUMPS - AP    Bend your foot up and down at your ankle joint as shown.    Video # [id number] Repeat 3 Times   Hold 20 Seconds   Complete 1 Set   Perform 2 Times a Day          Ankle inversion eversion    With your legs out in front of you turn your feet in and then out Repeat 3 Times   Hold 20 Seconds   Perform 2 Times a Day          Runners Calf Stretch    1. Begin in a standing position with both hands against a wall and the legs staggered with the targeted leg behind.  2. Push the toes down into the ground in order to activate the calf muscles.  3. While maintaining this activation, press into the wall and stretch the back of the calf, flexing the ankle as far as possible. Repeat 3 Times   Hold 20 Seconds   Complete 1 Set   Perform 2 Times a Day                    10/18/23  Emailed HEP to [email or web address]     HOME EXERCISE PROGRAM  Created by Christine Mulvihill  Oct 18th, 2023  View videos at [email or web address]    4 Exercises    HAMSTRING STRETCH WITH TOWEL    While lying down on your back, hook a towel or strap under your foot and draw up your leg until a stretch is felt along the backside of your leg.    Keep your  knee in a straightened position during the stretch.    Video # [id number] Repeat 3 Times   Hold 30 Seconds   Complete 1 Set   Perform 2 Times a Day          QUADRICEPS STRETCH - SIDE LYING    Lie on your side with your target limb on top. Next, grab your target limb below the knee and pull your knee into a more bent position until a stretch is felt along the front of your thigh. Repeat 3 Times   Hold 30 Seconds   Complete 1 Set   Perform 2 Times a Day          SUPINE HIP EXTERNAL ROTATION STRETCH - MODIFIED NOAH OR FIGURE 4    While lying on your back with your leg crossed over your knee, use your hand and push the crossed knee away from you as shown.    Video # [id number] Repeat 3 Times   Hold 30 Seconds   Complete 1 Set   Perform 2 Times a Day          Piriformis Stretch    Lie on your back with your uninvolved leg bent and the ankle of your involved leg crossed over the top (as shown). Grab the knee of the involved leg with both hands and stretch toward your chest and then over in the direction of the opposite shoulder. Hold the stretch for 15 seconds, then relax.    Do 5 - 15 second stretches on each leg. Repeat 3 Times   Hold 30 Seconds   Perform 2 Times

## 2023-10-30 NOTE — LETTER
Dear DR. Virk,    Thank you for this referral. Please review the attached notes and plan of care for your approval.  Please contact our department with any questions.     Sincerely,     Christine Blair Mulvihill, PT  [address]  [place] [address]  Phone [phone]  Fax  [phone]    By co-signing this Plan of Care (POC) you agree to the following:  I have reviewed the the Plan of Care established by the therapist within this document and certify that the services are skilled and medically necessary. I have reviewed the plan and recommend that these services continue to meet the goals stated in this document.    PHYSICIAN SIGNATURE: __________________________________     DATE: ___________________  TIME: _____________           Physical Therapy Plan of Care 10/30/23   Effective from: 10/30/2023  Effective to: 2024    Plan ID: [id number]            Participants as of Finalize on 2023    Name Type Comments Contact Info    Eduardo Virk MD PCP - General  [phone]    Christine Blair Mulvihill, PT Physical Therapist         Last Progress Notes Note     Author: Mulvihill, Christine Blair, PT Status: Signed Last edited: 10/30/2023  8:00 AM         Physical Therapy Progress Note    [facility] Fax: [phone]    PT RE-EVALUATION FOR OUTPATIENT THERAPY    Patient: Les Garcia     MRN: [id number]  : 1945 78 y.o.    Referring Physician: Eduardo Virk MD  Date of Visit: 10/30/2023    New Certification Dates: 10/30/23 through 24    Recommended Frequency & Duration:  2 times/week for up to 3 months        Diagnosis:   1. Contusion of left thigh, initial encounter    2. Right foot drop    3. Difficulty in walking        Chief Complaints:  Chief Complaint   Patient presents with    Dec Strength    Balance Deficits    Difficulty Walking    Dec ROM    Pain    Decreased recreational/play activity    Abnormality Of Gait    Decreased Mobility       Precautions:   Existing  Precautions/Restrictions: cardiac  Precautions comments: MI, cardiac stent, right foot drop    TODAY'S VISIT:    Time In Session:  Start Time: 0800  Stop Time: 0900  Time Calculation (min): 60 min   General Information - 10/30/23 0807        Session Details    Document Type daily treatment     Mode of Treatment individual therapy        General Information    Onset of Illness/Injury or Date of Surgery 09/06/23     Referring Physician Eduardo Virk MD     History of present illness/functional impairment On Sept 6th tripped on his right drop foot and twisted around landing on his left hip area.  Pt was seen in ED and special tests were performed (-) for fractures. Pt was away at the time on vacation so followed up with ortho upon return and was referred to outpatient PT. 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   He notes that he does have some dull pain lingering however it is overall much improved. adls:  increased pain with walking fast,  aching in leg with adls with associated decreased mobility.   Pts goals:  eliminate left Hamstring pain and improve mobility.  He would also like to not trip anymore so would like his right foot drop addressed at some point including possible AFOs.     Patient/Family/Caregiver Comments/Observations Pt reports that his symptoms in his left HS come and go and that the intensity of pain varies.  Did some advanced adls on Saturday and his left thigh was sore but sx are not back to baseline.   Pt would like to add treatment of his right foot drop as it was cause of left thigh contusion and causes him to trip intermittently.  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  Over the years he has been braced however has not had an AFO in 46 years.  Over the years he has caught his  foot while walking however recently it has resulted in an injury to his left thigh and therefore he is interested in PT in conjunction with bracing as indicated to avoid further falls and injuries.  adls:   Difficulties with squatiting, walking fast and running quickly in emergent situations.  Unable to sit in NOAH position on floor.  Pts goal:  to be able to walk without tripping.  Pt is aware of long nature of injury and is looking to improve situation and does not expect it to return to normal.     Existing Precautions/Restrictions cardiac     Precautions comments MI, cardiac stent, right foot drop                Daily Falls Screen - 10/30/23 0807        Daily Falls Assessment    Patient reported fall since last visit No                Pain/Vitals - 10/30/23 0807        Pain Assessment    Currently in pain Yes     Preferred Pain Scale number (Numeric Rating Pain Scale)     Pain Side/Orientation left;right     Pain: Body location Leg;Ankle   left HS -= 0-4/10.   Denies pain in ankle.    Pain Rating (0-10): Pre Activity 3     Pain Rating (0-10): Post Activity 3        Pain Intervention    Intervention  MT, TE, TA     Post Intervention Comments no increased sx post treatment                PT - 10/30/23 0807        Physical Therapy    Physical Therapy [facility] PT        PT Plan    Frequency of treatment 2 times/week     PT Duration 3 months     PT Cert From 10/30/23     PT Cert To 01/30/24     Date PT POC was sent to provider 10/30/23     Signed PT Plan of Care received?  No   signed 10/16/23 - for HS, POC for HS/new R footdrop 10/30/23               Assessment and Plan - 10/30/23 6172        Assessment    Plan of Care reviewed and patient/family in agreement Yes     System Pathology/Pathophysiology Noted musculoskeletal     Functional Limitations in Following Categories (PT Eval) self-care;community/leisure;home management     Rehab Potential/Prognosis good, to achieve stated therapy  goals;adequate, monitor progress closely   due to long involvement with right foot drop - monitor progress closely    Problem List abnormal muscle tone;impaired communication;pain;decreased strength;decreased flexibility     Clinical Assessment Les has shown progress with left thigh pain and spasms with pain levels and number of incidences reduced while ROM and strength has increased since IE.  Upon evaluation, his right ankle displays decreased AROM into peroneal nerve distribution - dorsiflexion and eversion, decreased strength of right ankle, and deviated gait with decrease stance phase of right lE with decreased, and an overalll decrease in adl functioning as indicated in his FAAM = 69% disability.  Pt will benefit from assessment by orthotist for AFO as well as skilled PT intervention to improve available ROM (injury from his 30s - pt is realistic regarding progress) and strength, improve gait and overall functioning.  His left HS which was injured by a fall due to his right foot drop has been progressing well with improved mobility and strength and an improved LEFT score = 30% disabled which is improved  76% disability at IE.  Continuation of skilled PT is recommended at this time for left HS in conjunction with initiation of right foot drop PT to maximize functional independence.     Plan and Recommendations Continued PT for left thigh weaning to HEP as appropriate.  Initiate PT for right ankle foot drop and difficulties walking.                 OBJECTIVE MEASUREMENTS/DATA:     ROM    Range of Motion - 10/30/23 0800        LEFT: Lower Extremity PROM Assessment    Hip Flexion  70 degrees        RIGHT: Lower Extremity AROM Assessment    Hip Flexion   100 degrees     Ankle Dorsiflexion   -12 degrees     Ankle Plantarflexion   48 degrees     Ankle Inversion   32 degrees     Ankle Eversion   6 degrees        LEFT: Lower Extremity AROM Assessment    Ankle Dorsiflexion   10 degrees     Ankle Plantarflexion   56  degrees     Ankle Inversion   34 degrees     Ankle Eversion   18 degrees               MMT    Manual Muscle Tests - 10/30/23 0807        RIGHT: Lower Extremity Manual Muscle Test Assessment    Hip Flexion gross movement (4+/5) good plus     Hip Abduction gross movement (5/5) normal     Hip Adduction gross movement (5/5) normal     Knee Flexion strength (4/5) good     Knee Extension strength (4+/5) good plus     Ankle Dorsiflexion gross movement (2/5) poor     Ankle Plantarflexion gross movement (4+/5) good plus     Ankle Eversion gross movement (4-/5) good minus        LEFT: Lower Extremity Manual Muscle Test Assessment    Hip Flexion gross movement (5/5) normal     Hip Abduction gross movement (5/5) normal     Hip Adduction gross movement (4+/5) good plus     Knee Flexion strength (4+/5) good plus     Knee Extension strength (5/5) normal               Palpation    Palpation - 10/30/23 0807        Palpation    LE Palpation  no tenderness with palpation noted               Gait and Mobility       ROM and MMT        10/14/2023    23:00 10/30/2023   PT LE ROM Measurements   PROM: Right Hip Flexion 80 degrees       SLR    AROM: Right Hip Flexion 90 degrees 100 degrees   PROM: Left Hip Flexion 58 degrees       SLR 70 degrees   AROM: Left Hip Flexion 102 degrees    AROM: Left Hip Extension 113 degrees    PROM: Right Hip IR 22 degrees    PROM: Left Hip IR 14 degrees    PROM: Right Hip ER 45 degrees    PROM: Left Hip ER 45 degrees    AROM: Right Knee Flexion 132    AROM: Right Knee Extension 0    AROM: Right Ankle DF -15 degrees -12 degrees   AROM: Left Ankle DF 8 degrees 10 degrees   AROM: Right Ankle PF 58 degrees 48 degrees   AROM: Left Ankle PF 55 degrees 56 degrees   AROM: Right Ankle Inversion 24 degrees 32 degrees   AROM: Left Ankle Inversion 30 degrees 34 degrees   AROM: Right Ankle Eversion 4 degrees 6 degrees   AROM: Left Ankle Eversion 18 degrees 18 degrees   PT LE MMT   Right Hip Flexion (4+/5) good plus (4+/5)  good plus   Left Hip Flexion (5/5) normal (5/5) normal   Right Hip Extension (5/5) normal    Left Hip Extension (4/5) good    Right Hip ABD (5/5) normal (5/5) normal   Left Hip ABD (4+/5) good plus (5/5) normal   Right Hip ADD (5/5) normal (5/5) normal   Left Hip ADD (4+/5) good plus (4+/5) good plus   Right Knee Flexion (4/5) good (4/5) good   Left Knee Flexion (4/5) good (4+/5) good plus   Right Knee Extension (5/5) normal (4+/5) good plus   Left Knee Extension (5/5) normal (5/5) normal   Right Ankle DF (2/5) poor (2/5) poor   Left Ankle DF (5/5) normal    Right Ankle PF (4+/5) good plus (4+/5) good plus   Left Ankle PF (5/5) normal    Right Ankle Inversion (4/5) good    Left Ankle Inversion (4+/5) good plus    Right Ankle Eversion (4-/5) good minus (4-/5) good minus   Left Ankle Eversion (4-/5) good minus      Outcome Measures        10/11/2023    18:00 10/30/2023    08:07   PT OBJECTIVE Outcome Measures   30 Second Sit to Stand 10 repetitions       no hands  verbal cueing for full stand    PT SUBJECTIVE Outcome Measures   LEFS 19/80 = 24% functioning, 76% disabled 56/80 regarding thigh = 70% functioning - 30% disabled   PSFS % Score  30 %   FAAM  58/84=  69% functioning right ankle       Today's Treatment::    ORTHO PT FLOWSHEET    Exercises Current Session Time y/n   MODALITIES- HEAT/ICE  CPT 76898 TOTAL TIME FOR SESSION Not performed    Heat     Ice/Vasocompression     MODALITIES - E-STIM  CPT 98727   TOTAL TIME FOR SESSION Not performed    E-stim/H-Wave     MODALITIES - US TOTAL TIME FOR SESSION Not performed    US (CPT 69940)     Iontophoresis (CPT 37029)     Mechanical Traction     THER ACT  CPT 15378 TOTAL TIME FOR SESSION 20 min    Pain, falls, med changes completed y   Pt education Pt educated regarding IE results and POC and need for active participation in skilled PT intervention and HEP.  Discussed with pt follow up with MD regarding possible right LE AFO to avoid further tripping and  reinjury.  10/18/23 - reviewed possibility of AFO and pt shown normal hinged AFO, toe off and Spry step brace as options for dorsiflexion assistance in right LE to prevent further tripping/falling injuries.  Reviewed with pt heel toe gait pattern.  Also to discuss possibility of dorsiflexion assist brace vs/with trial of PT to improve mobility in right ankle as it is cause of left leg injury.   10/30/23 - pt education re:  Addition of right ankle to POC.  Instructed in HEP for right ankle.  See TE section.  Pt also educated in the use of his home EMS unit for contraction of anterior tibialis.  Set for 10 sec on/20 seconds off, 50 pps, 350 width.  Pt relayed understanding of set up and need to remove if burning or abnormal pain occurs       n          Y   Assessment 10/30/23 - Reevaluation performed to add right foot drop/deviated gait to plan of care.  Results of right ankle evaluation as well as plan discussed with pt as well as need to monitor progress closely due to long nature of involvement of right ankle.  Also discussed improvement in left thigh and meeting with orthotist Mani Gabriel in future for assessment for possible AFO.  Pt agreeable to this and confirmed desire to meet with orthotist.      Body Mechanics/Work Training     THER EX  CPT 78214 TOTAL TIME FOR SESSION 25 minutes    CARDIOVASCULAR      Nu Step     Recumbent Bike seat #24 Zortman dash x 10 minutes (1 mile distance) Y   Elliptical     Treadmill     HEP 10/30/23 - Pt instructed in a primary HEP for right ankle including ankle dorsiflexion/plantarflexion/inv/ev, calf towel stretch for gastroc and soleus and wall calf stretch.  See chart copy of HEP2Go sheet as issued to pt. Y   STRENGTHENING     Supine ther-ex       PB    Bridges       Bilateral knee fall outs       Ankle DF ecc control       Marching with TB  Prone       HS curls   2 x 10 5 sec     Added PB today  2 x 10 5 sec, Green band around knees  1 x 10, manual resistance    Right  Left LE    2/10   PTB  3 x 10                Seated ther-ex       LAQ      STS      HS curls focus eccentric               Standing ther-ex       Hip flex       Hip ext       Hip abd       Hip add       STRETCHING     LE stretching       HS strap stretch       Piriformis KTOS       Figure 4 push away       Gastroc stretch   3 x 30 sec  3 x 30 sec  3 x 30 sec  3 x 30 sec R only   In supine Manual by therapist - discussed ankle bracing options and messaging primary PT re: orthotist appt during stretching   n         Other stretching     REPEATED MOVEMENTS     NEURO RE-ED  CPT 53193 TOTAL TIME FOR SESSION Not performed    COORDINATION     POSTURAL RE-ED        PRE-GAIT ACTIVITIES      BALANCE TRAINING      Sitting balance     Standing balance     GAIT TRAINING  CPT 40296 TOTAL TIME FOR SESSION Not performed    Ambulation  With trial right AFO (script scanned in on 10/20)    Dynamic gait      Stair negotiation     Curb negotiation     Ramp negotiation     Outdoor ambulation     BWS/vector training     MANUAL  CPT 60175 TOTAL TIME FOR SESSION     Stretching        HS        Hip rotators        Hip flexors   3 x 20 sec  3 x 20 sec  3 x 20 sec       n   Mobilization      Massage DTM to left Medial HS pt prone    Taping  prn      10/30/23 -          HOME EXERCISE PROGRAM  Created by Christine Mulvihill  Oct 30th, 2023  View videos at [email or web address]    5 Exercises    CALF STRETCH WITH TOWEL - GASTROCNEMIUS    While in a seated position, place a towel around the ball of your foot and pull your ankle back until a stretch is felt on your calf area.    Keep your knee in a straightened position during the stretch.    Video # [id number] Repeat 3 Times   Hold 20 Seconds   Complete 1 Set   Perform 2 Times a Day          CALF STRETCH WITH TOWEL - SOLEUS    While in a seated position, place a towel around the ball of your foot and pull your ankle back until a stretch is felt on your calf area.    Keep your knee in a bent position during the  stretch.    Video # [id number] Repeat 3 Times   Hold 20 Seconds   Complete 1 Set   Perform 2 Times a Day          ANKLE PUMPS - AP    Bend your foot up and down at your ankle joint as shown.    Video # [id number] Repeat 3 Times   Hold 20 Seconds   Complete 1 Set   Perform 2 Times a Day          Ankle inversion eversion    With your legs out in front of you turn your feet in and then out Repeat 3 Times   Hold 20 Seconds   Perform 2 Times a Day          Runners Calf Stretch    1. Begin in a standing position with both hands against a wall and the legs staggered with the targeted leg behind.  2. Push the toes down into the ground in order to activate the calf muscles.  3. While maintaining this activation, press into the wall and stretch the back of the calf, flexing the ankle as far as possible. Repeat 3 Times   Hold 20 Seconds   Complete 1 Set   Perform 2 Times a Day                    10/18/23  Emailed HEP to [email or web address]     HOME EXERCISE PROGRAM  Created by Christine Mulvihill  Oct 18th, 2023  View videos at [email or web address]    4 Exercises    HAMSTRING STRETCH WITH TOWEL    While lying down on your back, hook a towel or strap under your foot and draw up your leg until a stretch is felt along the backside of your leg.    Keep your knee in a straightened position during the stretch.    Video # [id number] Repeat 3 Times   Hold 30 Seconds   Complete 1 Set   Perform 2 Times a Day          QUADRICEPS STRETCH - SIDE LYING    Lie on your side with your target limb on top. Next, grab your target limb below the knee and pull your knee into a more bent position until a stretch is felt along the front of your thigh. Repeat 3 Times   Hold 30 Seconds   Complete 1 Set   Perform 2 Times a Day          SUPINE HIP EXTERNAL ROTATION STRETCH - MODIFIED NOAH OR FIGURE 4    While lying on your back with your leg crossed over your knee, use your hand and push the crossed knee away from you as shown.    Video # [id number] Repeat 3 Times  "  Hold 30 Seconds   Complete 1 Set   Perform 2 Times a Day          Piriformis Stretch    Lie on your back with your uninvolved leg bent and the ankle of your involved leg crossed over the top (as shown). Grab the knee of the involved leg with both hands and stretch toward your chest and then over in the direction of the opposite shoulder. Hold the stretch for 15 seconds, then relax.    Do 5 - 15 second stretches on each leg. Repeat 3 Times   Hold 30 Seconds   Perform 2 Times                      Goals:   Goals      PT left leg/gait dysfunction Goals - R drop foot added 10/30/23          Short & Long Term Goals Time Frame Result Comment/Progress   Assess 6mWT 2 weeks   NT     Pt will be independent in primary HEP 4-6 weeks  Met for L  New for R     Patient pain will decrease from >5/10 to less <3/10 for all functional mobility  4-6 weeks   MET      Pt with decreased pain while \"walking fast\" with ability to ambulate on TM for 5 minutes at 2.5-3.0 mph.   4-6 weeks  NT     Pt will increase bilateral SLR by 5-10 deg to allow for improved stride length to normalize gait pattern. 4-6 weeks  MET     Pt will show improved strength of right HS, hip add/abd/ext by 1/2 grade to allow for improved gait and tolerance for adls. 8-12 weeks       Increase LEFS >/= 9 points to increase function. 4-6 weeks       Increase LEFS >/= 18 points to increase function   8-12 weeks  Progress     Improve 30 sec STS to 13 reps or more without UE use to indicate improving LE functional strength. 8-12 weeks       Pt will be independent with advanced HEP to increase carryover at home 8-12 weeks       Pt will increase distance during 6mWT by 191 feet or more to meet the MCID indicating significant improvement in endurance. 4-6 weeks       Pt will increase distance during 6mWT by an additional 191 feet or more to meet the MCID indicating significant improvement in endurance. 8-12 weeks  NT     Pt will be properly referred to MD and orthotist for " assessment for right LE AFO to improve gait dynamics to decrease left LE pain. 8-12 weeks MET 10/30/23 - appt set with Mani Gabriel Orthotist   Pt will have increased right dorsiflexion by 5 degrees to improve ability to perform more normalized gait cycle to reduce risk of falls. 2-4 weeks NEW 10/30/23 = -15   Pt will be able to perform 10 m walk test meeting age specific norms to determine improved gait 4-6 weeks NEW    Pt will be able to walk with use of right AFO without noted tripping to indicate improve gait and decreased fall risk. 4-6 weeks NEW    Pt will be able to perform a mini squat or golfers T to allow him to get into a low cabinet 4-6 weeks NEW    Pt will be independent in primary HEP for right ankle to assure carryover of exercises at home. 2-4 weeks NEW    Pt will be independent in advanced HEP for R ankle to assure carryover at home upon discharge. 4-6 weeks NEW    Pt will show overall improvement in function in relation to his right ankle as indicated by improvement on FAAM to 30% disability or less. 4-6 weeks NEW 10/30/23 = 69% disability                        This 78 y.o. year old male presents to PT with above stated diagnosis. Physical Therapy evaluation reveals abnormal muscle tone, impaired communication, pain, decreased strength, decreased flexibility resulting in self-care, community/leisure, home management limitations. Les Garcia will benefit from skilled PT services to address limitation, work towards rehab and patient goals and maximize PLOF of chosen ADLs.     Planned Services: The patient's treatment will include  , .     Christine Blair Mulvihill, PT                       Current Participants as of 11/1/2023    Name Type Comments Contact Info    Eduardo Virk MD PCP - General  [phone]    Signature pending    Christine Blair Mulvihill, PT Physical Therapist      Electronically signed by Christine Blair Mulvihill, PT at 11/1/2023 0605 EDT

## 2023-11-01 ENCOUNTER — HOSPITAL ENCOUNTER (OUTPATIENT)
Dept: PHYSICAL THERAPY | Facility: REHABILITATION | Age: 78
Setting detail: THERAPIES SERIES
Discharge: HOME | End: 2023-11-01
Attending: FAMILY MEDICINE
Payer: MEDICARE

## 2023-11-01 DIAGNOSIS — R26.2 DIFFICULTY IN WALKING: ICD-10-CM

## 2023-11-01 DIAGNOSIS — M21.371 RIGHT FOOT DROP: ICD-10-CM

## 2023-11-01 DIAGNOSIS — S70.12XA CONTUSION OF LEFT THIGH, INITIAL ENCOUNTER: Primary | ICD-10-CM

## 2023-11-01 PROCEDURE — 97140 MANUAL THERAPY 1/> REGIONS: CPT | Mod: GP

## 2023-11-01 PROCEDURE — 97530 THERAPEUTIC ACTIVITIES: CPT | Mod: GP

## 2023-11-01 PROCEDURE — 97110 THERAPEUTIC EXERCISES: CPT | Mod: GP

## 2023-11-01 NOTE — PROGRESS NOTES
Physical Therapy Visit    PT DAILY NOTE FOR OUTPATIENT THERAPY    Patient: Les Garcia MRN: [id number]  : 1945 78 y.o.  Referring Physician: Eduardo Virk MD  Date of Visit: 2023    Certification Dates: 10/30/23 through 24    Diagnosis:   1. Contusion of left thigh, initial encounter    2. Right foot drop    3. Difficulty in walking        Chief Complaints:  pain and decreased adl functioning    Precautions:   Existing Precautions/Restrictions: cardiac  Precautions comments: MI, cardiac stent, right foot drop      TODAY'S VISIT    Time In Session:  Start Time: 0800  Stop Time: 0855  Time Calculation (min): 55 min   History/Vitals/Pain/Encounter Info - 23 0758        Injury History/Precautions/Daily Required Info    Document Type daily treatment     Primary Therapist Christine Mulvihill, PT     Chief Complaint/Reason for Visit  pain and decreased adl functioning     Onset of Illness/Injury or Date of Surgery 23     Referring Physician Eduardo Virk MD     Existing Precautions/Restrictions cardiac     Precautions comments MI, cardiac stent, right foot drop     History of present illness/functional impairment On  tripped on his right drop foot and twisted around landing on his left hip area.  Pt was seen in ED and special tests were performed (-) for fractures. Pt was away at the time on vacation so followed up with ortho upon return and was referred to outpatient PT. 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   He notes that he does have some dull pain lingering however it is overall much improved. adls:  increased pain with walking fast,  aching in leg with adls with associated decreased mobility.   Pts goals:  eliminate left Hamstring pain and improve mobility.  He would also like to not trip anymore so would like his right foot drop addressed at some  "point including possible AFOs.     Patient/Family/Caregiver Comments/Observations Pt reports no issues after last visit.  Dull ache in left HS noted \"there is a knot there\" referring to his left HS.     Patient reported fall since last visit No        Pain Assessment    Currently in pain Yes     Preferred Pain Scale number (Numeric Rating Pain Scale)     Pain Side/Orientation left   left HS, right ankle    Pain: Body location Leg;Ankle     Pain Rating (0-10): Pre Activity 2     Pain Rating (0-10): Post Activity 0        Pain Intervention    Intervention  MT, TE, TA     Post Intervention Comments pain abolished in HS post treatment                Daily Treatment Assessment and Plan - 11/01/23 0758        Daily Treatment Assessment and Plan    Progress toward goals Progressing     Daily Outcome Summary Les responded well today to treatment.  one incident of tripping on right toes during 2MWT however able to catch self.  improved 30 sec STS noted by 2 reps as compared to IE   (+) goal progression.     Plan and Recommendations progress as tolerated.  Resume/add exercises as noted on flow sheet.                     OBJECTIVE DATA TAKEN TODAY:    None taken  Gait and Mobility    Gait and Mobility - 11/01/23 0813        Gait Training    Gait surface comments flat     Assistive Device none   during 2 MWT    Pattern step-through     Deviations/Abnormal Patterns right sided deviations   pt catching right toe mid walk, causing pt to trip, however able to correct himself.                Today's Treatment:    ORTHO PT FLOWSHEET    Exercises Current Session Time y/n   MODALITIES- HEAT/ICE  CPT 96058 TOTAL TIME FOR SESSION Not performed    Heat     Ice/Vasocompression     MODALITIES - E-STIM  CPT 49185   TOTAL TIME FOR SESSION Not performed    E-stim/H-Wave     MODALITIES - US TOTAL TIME FOR SESSION Not performed    US (CPT 60105)     Iontophoresis (CPT 15804)     Mechanical Traction     THER ACT  CPT 12759 TOTAL TIME FOR SESSION " 15 min    Pain, falls, med changes completed y   Pt education Pt educated regarding IE results and POC and need for active participation in skilled PT intervention and HEP.  Discussed with pt follow up with MD regarding possible right LE AFO to avoid further tripping and reinjury.  10/18/23 - reviewed possibility of AFO and pt shown normal hinged AFO, toe off and Spry step brace as options for dorsiflexion assistance in right LE to prevent further tripping/falling injuries.  Reviewed with pt heel toe gait pattern.  Also to discuss possibility of dorsiflexion assist brace vs/with trial of PT to improve mobility in right ankle as it is cause of left leg injury.   10/30/23 - pt education re:  Addition of right ankle to POC.  Instructed in HEP for right ankle.  See TE section.  Pt also educated in the use of his home EMS unit for contraction of anterior tibialis.  Set for 10 sec on/20 seconds off, 50 pps, 350 width.  Pt relayed understanding of set up and need to remove if burning or abnormal pain occurs       n             Assessment 10/30/23 - Reevaluation performed to add right foot drop/deviated gait to plan of care.  Results of right ankle evaluation as well as plan discussed with pt as well as need to monitor progress closely due to long nature of involvement of right ankle.  Also discussed improvement in left thigh and meeting with orthotist Mani Gabriel in future for assessment for possible AFO.  Pt agreeable to this and confirmed desire to meet with orthotist.    11/1/23 - performed 2MWT/6MWT and 30 second STS.             Y   Body Mechanics/Work Training     THER EX  CPT 94918 TOTAL TIME FOR SESSION 25 minutes    CARDIOVASCULAR      Nu Step X 10 minutes [place] route   L1 Y   Recumbent Bike seat #24  x 10 minutes     Elliptical     Treadmill     HEP 10/30/23 - Pt instructed in a primary HEP for right ankle including ankle dorsiflexion/plantarflexion/inv/ev, calf towel stretch for gastroc and soleus and  wall calf stretch.  See chart copy of HEP2Go sheet as issued to pt. Y   STRENGTHENING     Supine ther-ex       PB    Bridges       Bilateral knee fall outs       Ankle DF ecc control       Marching with TB  Prone       HS curls   2 x 10 5 sec     GTB  2 x 10 5 sec, Green band around knees  1 x 10, manual resistance    Right  Left LE   2/10   GTB  3 x 10   Y  Y    Y       Seated ther-ex       LAQ      STS      HS curls focus eccentric            nv  nv   Standing ther-ex       Hip flex       Hip ext       Hip abd       Hip add    nv  nv  nv  nv   STRETCHING     LE stretching       HS strap stretch       Piriformis KTOS       Figure 4 push away       Gastroc stretch   3 x 30 sec  3 x 30 sec  3 x 30 sec  3 x 30 sec R only   In supine Manual by therapist - discussed ankle bracing options and messaging primary PT re: orthotist appt during stretching   Y      Y   Other stretching     REPEATED MOVEMENTS     NEURO RE-ED  CPT 73394 TOTAL TIME FOR SESSION Not performed    COORDINATION     POSTURAL RE-ED        PRE-GAIT ACTIVITIES      BALANCE TRAINING      Sitting balance     Standing balance     GAIT TRAINING  CPT 17253 TOTAL TIME FOR SESSION Brief billed in TA    Ambulation  2/6MWT Y   Dynamic gait      Stair negotiation     Curb negotiation     Ramp negotiation     Outdoor ambulation     BWS/vector training     MANUAL  CPT 24451 TOTAL TIME FOR SESSION 15 min    Stretching        HS        Hip rotators        Hip flexors        Calf strap stretch   2 x 20 sec  2 x 20 sec  2 x 20 sec  2/30 sec   Y  Y  n  Y   Mobilization      Massage DTM to left Medial HS pt prone Y   Taping  prn      10/30/23 -          HOME EXERCISE PROGRAM  Created by Christine Mulvihill  Oct 30th, 2023  View videos at [email or web address]    5 Exercises    CALF STRETCH WITH TOWEL - GASTROCNEMIUS    While in a seated position, place a towel around the ball of your foot and pull your ankle back until a stretch is felt on your calf area.    Keep your knee in a  straightened position during the stretch.    Video # [id number] Repeat 3 Times   Hold 20 Seconds   Complete 1 Set   Perform 2 Times a Day          CALF STRETCH WITH TOWEL - SOLEUS    While in a seated position, place a towel around the ball of your foot and pull your ankle back until a stretch is felt on your calf area.    Keep your knee in a bent position during the stretch.    Video # [id number] Repeat 3 Times   Hold 20 Seconds   Complete 1 Set   Perform 2 Times a Day          ANKLE PUMPS - AP    Bend your foot up and down at your ankle joint as shown.    Video # [id number] Repeat 3 Times   Hold 20 Seconds   Complete 1 Set   Perform 2 Times a Day          Ankle inversion eversion    With your legs out in front of you turn your feet in and then out Repeat 3 Times   Hold 20 Seconds   Perform 2 Times a Day          Runners Calf Stretch    1. Begin in a standing position with both hands against a wall and the legs staggered with the targeted leg behind.  2. Push the toes down into the ground in order to activate the calf muscles.  3. While maintaining this activation, press into the wall and stretch the back of the calf, flexing the ankle as far as possible. Repeat 3 Times   Hold 20 Seconds   Complete 1 Set   Perform 2 Times a Day                    10/18/23  Emailed HEP to [email or web address]     HOME EXERCISE PROGRAM  Created by Christine Mulvihill  Oct 18th, 2023  View videos at [email or web address]    4 Exercises    HAMSTRING STRETCH WITH TOWEL    While lying down on your back, hook a towel or strap under your foot and draw up your leg until a stretch is felt along the backside of your leg.    Keep your knee in a straightened position during the stretch.    Video # [id number] Repeat 3 Times   Hold 30 Seconds   Complete 1 Set   Perform 2 Times a Day          QUADRICEPS STRETCH - SIDE LYING    Lie on your side with your target limb on top. Next, grab your target limb below the knee and pull your knee into a more bent position  until a stretch is felt along the front of your thigh. Repeat 3 Times   Hold 30 Seconds   Complete 1 Set   Perform 2 Times a Day          SUPINE HIP EXTERNAL ROTATION STRETCH - MODIFIED NOAH OR FIGURE 4    While lying on your back with your leg crossed over your knee, use your hand and push the crossed knee away from you as shown.    Video # [id number] Repeat 3 Times   Hold 30 Seconds   Complete 1 Set   Perform 2 Times a Day          Piriformis Stretch    Lie on your back with your uninvolved leg bent and the ankle of your involved leg crossed over the top (as shown). Grab the knee of the involved leg with both hands and stretch toward your chest and then over in the direction of the opposite shoulder. Hold the stretch for 15 seconds, then relax.    Do 5 - 15 second stretches on each leg. Repeat 3 Times   Hold 30 Seconds   Perform 2 Times

## 2023-11-01 NOTE — PROGRESS NOTES
Physical Therapy Progress Note    [facility] Fax: [phone]    PT RE-EVALUATION FOR OUTPATIENT THERAPY    Patient: Les Garcia     MRN: [id number]  : 1945 78 y.o.    Referring Physician: Eduardo Virk MD  Date of Visit: 10/30/2023    New Certification Dates: 10/30/23 through 24    Recommended Frequency & Duration:  2 times/week for up to 3 months        Diagnosis:   1. Contusion of left thigh, initial encounter    2. Right foot drop    3. Difficulty in walking        Chief Complaints:  Chief Complaint   Patient presents with    Dec Strength    Balance Deficits    Difficulty Walking    Dec ROM    Pain    Decreased recreational/play activity    Abnormality Of Gait    Decreased Mobility       Precautions:   Existing Precautions/Restrictions: cardiac  Precautions comments: MI, cardiac stent, right foot drop    TODAY'S VISIT:    Time In Session:  Start Time: 800  Stop Time: 900  Time Calculation (min): 60 min   General Information - 10/30/23 0807        Session Details    Document Type daily treatment     Mode of Treatment individual therapy        General Information    Onset of Illness/Injury or Date of Surgery 23     Referring Physician Eduardo Virk MD     History of present illness/functional impairment On  tripped on his right drop foot and twisted around landing on his left hip area.  Pt was seen in ED and special tests were performed (-) for fractures. Pt was away at the time on vacation so followed up with ortho upon return and was referred to outpatient PT. 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   He notes that he does have some dull pain lingering however it is overall much improved. adls:  increased pain with walking fast,  aching in leg with adls with associated decreased mobility.   Pts goals:  eliminate left Hamstring pain and improve  mobility.  He would also like to not trip anymore so would like his right foot drop addressed at some point including possible AFOs.     Patient/Family/Caregiver Comments/Observations Pt reports that his symptoms in his left HS come and go and that the intensity of pain varies.  Did some advanced adls on Saturday and his left thigh was sore but sx are not back to baseline.   Pt would like to add treatment of his right foot drop as it was cause of left thigh contusion and causes him to trip intermittently.  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  Over the years he has been braced however has not had an AFO in 46 years.  Over the years he has caught his foot while walking however recently it has resulted in an injury to his left thigh and therefore he is interested in PT in conjunction with bracing as indicated to avoid further falls and injuries.  adls:   Difficulties with squatiting, walking fast and running quickly in emergent situations.  Unable to sit in NOAH position on floor.  Pts goal:  to be able to walk without tripping.  Pt is aware of long nature of injury and is looking to improve situation and does not expect it to return to normal.     Existing Precautions/Restrictions cardiac     Precautions comments MI, cardiac stent, right foot drop                Daily Falls Screen - 10/30/23 0807        Daily Falls Assessment    Patient reported fall since last visit No                Pain/Vitals - 10/30/23 0807        Pain Assessment    Currently in pain Yes     Preferred Pain Scale number (Numeric Rating Pain Scale)     Pain Side/Orientation left;right     Pain: Body location Leg;Ankle   left HS -= 0-4/10.   Denies pain in ankle.    Pain Rating (0-10): Pre Activity 3     Pain Rating (0-10): Post Activity 3        Pain Intervention    Intervention  MT, TE, TA     Post Intervention Comments no increased sx post  treatment                PT - 10/30/23 0807        Physical Therapy    Physical Therapy Specialty Ortho [facility] Sports PT        PT Plan    Frequency of treatment 2 times/week     PT Duration 3 months     PT Cert From 10/30/23     PT Cert To 01/30/24     Date PT POC was sent to provider 10/30/23     Signed PT Plan of Care received?  No   signed 10/16/23 - for HS, POC for HS/new R footdrop 10/30/23               Assessment and Plan - 10/30/23 0135        Assessment    Plan of Care reviewed and patient/family in agreement Yes     System Pathology/Pathophysiology Noted musculoskeletal     Functional Limitations in Following Categories (PT Eval) self-care;community/leisure;home management     Rehab Potential/Prognosis good, to achieve stated therapy goals;adequate, monitor progress closely   due to long involvement with right foot drop - monitor progress closely    Problem List abnormal muscle tone;impaired communication;pain;decreased strength;decreased flexibility     Clinical Assessment Les has shown progress with left thigh pain and spasms with pain levels and number of incidences reduced while ROM and strength has increased since IE.  Upon evaluation, his right ankle displays decreased AROM into peroneal nerve distribution - dorsiflexion and eversion, decreased strength of right ankle, and deviated gait with decrease stance phase of right lE with decreased, and an overalll decrease in adl functioning as indicated in his FAAM = 69% disability.  Pt will benefit from assessment by orthotist for AFO as well as skilled PT intervention to improve available ROM (injury from his 30s - pt is realistic regarding progress) and strength, improve gait and overall functioning.  His left HS which was injured by a fall due to his right foot drop has been progressing well with improved mobility and strength and an improved LEFT score = 30% disabled which is improved  76% disability at IE.  Continuation of skilled PT is recommended at  this time for left HS in conjunction with initiation of right foot drop PT to maximize functional independence.     Plan and Recommendations Continued PT for left thigh weaning to HEP as appropriate.  Initiate PT for right ankle foot drop and difficulties walking.                 OBJECTIVE MEASUREMENTS/DATA:     ROM    Range of Motion - 10/30/23 0800        LEFT: Lower Extremity PROM Assessment    Hip Flexion  70 degrees        RIGHT: Lower Extremity AROM Assessment    Hip Flexion   100 degrees     Ankle Dorsiflexion   -12 degrees     Ankle Plantarflexion   48 degrees     Ankle Inversion   32 degrees     Ankle Eversion   6 degrees        LEFT: Lower Extremity AROM Assessment    Ankle Dorsiflexion   10 degrees     Ankle Plantarflexion   56 degrees     Ankle Inversion   34 degrees     Ankle Eversion   18 degrees               MMT    Manual Muscle Tests - 10/30/23 0807        RIGHT: Lower Extremity Manual Muscle Test Assessment    Hip Flexion gross movement (4+/5) good plus     Hip Abduction gross movement (5/5) normal     Hip Adduction gross movement (5/5) normal     Knee Flexion strength (4/5) good     Knee Extension strength (4+/5) good plus     Ankle Dorsiflexion gross movement (2/5) poor     Ankle Plantarflexion gross movement (4+/5) good plus     Ankle Eversion gross movement (4-/5) good minus        LEFT: Lower Extremity Manual Muscle Test Assessment    Hip Flexion gross movement (5/5) normal     Hip Abduction gross movement (5/5) normal     Hip Adduction gross movement (4+/5) good plus     Knee Flexion strength (4+/5) good plus     Knee Extension strength (5/5) normal               Palpation    Palpation - 10/30/23 0807        Palpation    LE Palpation  no tenderness with palpation noted               Gait and Mobility       ROM and MMT        10/14/2023    23:00 10/30/2023   PT LE ROM Measurements   PROM: Right Hip Flexion 80 degrees       SLR    AROM: Right Hip Flexion 90 degrees 100 degrees   PROM: Left Hip  Flexion 58 degrees       SLR 70 degrees   AROM: Left Hip Flexion 102 degrees    AROM: Left Hip Extension 113 degrees    PROM: Right Hip IR 22 degrees    PROM: Left Hip IR 14 degrees    PROM: Right Hip ER 45 degrees    PROM: Left Hip ER 45 degrees    AROM: Right Knee Flexion 132    AROM: Right Knee Extension 0    AROM: Right Ankle DF -15 degrees -12 degrees   AROM: Left Ankle DF 8 degrees 10 degrees   AROM: Right Ankle PF 58 degrees 48 degrees   AROM: Left Ankle PF 55 degrees 56 degrees   AROM: Right Ankle Inversion 24 degrees 32 degrees   AROM: Left Ankle Inversion 30 degrees 34 degrees   AROM: Right Ankle Eversion 4 degrees 6 degrees   AROM: Left Ankle Eversion 18 degrees 18 degrees   PT LE MMT   Right Hip Flexion (4+/5) good plus (4+/5) good plus   Left Hip Flexion (5/5) normal (5/5) normal   Right Hip Extension (5/5) normal    Left Hip Extension (4/5) good    Right Hip ABD (5/5) normal (5/5) normal   Left Hip ABD (4+/5) good plus (5/5) normal   Right Hip ADD (5/5) normal (5/5) normal   Left Hip ADD (4+/5) good plus (4+/5) good plus   Right Knee Flexion (4/5) good (4/5) good   Left Knee Flexion (4/5) good (4+/5) good plus   Right Knee Extension (5/5) normal (4+/5) good plus   Left Knee Extension (5/5) normal (5/5) normal   Right Ankle DF (2/5) poor (2/5) poor   Left Ankle DF (5/5) normal    Right Ankle PF (4+/5) good plus (4+/5) good plus   Left Ankle PF (5/5) normal    Right Ankle Inversion (4/5) good    Left Ankle Inversion (4+/5) good plus    Right Ankle Eversion (4-/5) good minus (4-/5) good minus   Left Ankle Eversion (4-/5) good minus      Outcome Measures        10/11/2023    18:00 10/30/2023    08:07   PT OBJECTIVE Outcome Measures   30 Second Sit to Stand 10 repetitions       no hands  verbal cueing for full stand    PT SUBJECTIVE Outcome Measures   LEFS 19/80 = 24% functioning, 76% disabled 56/80 regarding thigh = 70% functioning - 30% disabled   PSFS % Score  30 %   FAAM  58/84=  69% functioning right  ankle       Today's Treatment::    ORTHO PT FLOWSHEET    Exercises Current Session Time y/n   MODALITIES- HEAT/ICE  CPT 93580 TOTAL TIME FOR SESSION Not performed    Heat     Ice/Vasocompression     MODALITIES - E-STIM  CPT 97983   TOTAL TIME FOR SESSION Not performed    E-stim/H-Wave     MODALITIES - US TOTAL TIME FOR SESSION Not performed    US (CPT 95718)     Iontophoresis (CPT 11164)     Mechanical Traction     THER ACT  CPT 09366 TOTAL TIME FOR SESSION 20 min    Pain, falls, med changes completed y   Pt education Pt educated regarding IE results and POC and need for active participation in skilled PT intervention and HEP.  Discussed with pt follow up with MD regarding possible right LE AFO to avoid further tripping and reinjury.  10/18/23 - reviewed possibility of AFO and pt shown normal hinged AFO, toe off and Spry step brace as options for dorsiflexion assistance in right LE to prevent further tripping/falling injuries.  Reviewed with pt heel toe gait pattern.  Also to discuss possibility of dorsiflexion assist brace vs/with trial of PT to improve mobility in right ankle as it is cause of left leg injury.   10/30/23 - pt education re:  Addition of right ankle to POC.  Instructed in HEP for right ankle.  See TE section.  Pt also educated in the use of his home EMS unit for contraction of anterior tibialis.  Set for 10 sec on/20 seconds off, 50 pps, 350 width.  Pt relayed understanding of set up and need to remove if burning or abnormal pain occurs       n          Y   Assessment 10/30/23 - Reevaluation performed to add right foot drop/deviated gait to plan of care.  Results of right ankle evaluation as well as plan discussed with pt as well as need to monitor progress closely due to long nature of involvement of right ankle.  Also discussed improvement in left thigh and meeting with orthotist Mani Gabriel in future for assessment for possible AFO.  Pt agreeable to this and confirmed desire to meet with  orthotist.      Body Mechanics/Work Training     THER EX  CPT 03921 TOTAL TIME FOR SESSION 25 minutes    CARDIOVASCULAR      Nu Step     Recumbent Bike seat #24 [place] dash x 10 minutes (1 mile distance) Y   Elliptical     Treadmill     HEP 10/30/23 - Pt instructed in a primary HEP for right ankle including ankle dorsiflexion/plantarflexion/inv/ev, calf towel stretch for gastroc and soleus and wall calf stretch.  See chart copy of HEP2Go sheet as issued to pt. Y   STRENGTHENING     Supine ther-ex       PB    Bridges       Bilateral knee fall outs       Ankle DF ecc control       Marching with TB  Prone       HS curls   2 x 10 5 sec     Added PB today  2 x 10 5 sec, Green band around knees  1 x 10, manual resistance    Right  Left LE   2/10   PTB  3 x 10                Seated ther-ex       LAQ      STS      HS curls focus eccentric               Standing ther-ex       Hip flex       Hip ext       Hip abd       Hip add       STRETCHING     LE stretching       HS strap stretch       Piriformis KTOS       Figure 4 push away       Gastroc stretch   3 x 30 sec  3 x 30 sec  3 x 30 sec  3 x 30 sec R only   In supine Manual by therapist - discussed ankle bracing options and messaging primary PT re: orthotist appt during stretching   n         Other stretching     REPEATED MOVEMENTS     NEURO RE-ED  CPT 79872 TOTAL TIME FOR SESSION Not performed    COORDINATION     POSTURAL RE-ED        PRE-GAIT ACTIVITIES      BALANCE TRAINING      Sitting balance     Standing balance     GAIT TRAINING  CPT 57163 TOTAL TIME FOR SESSION Not performed    Ambulation  With trial right AFO (script scanned in on 10/20)    Dynamic gait      Stair negotiation     Curb negotiation     Ramp negotiation     Outdoor ambulation     BWS/vector training     MANUAL  CPT 77278 TOTAL TIME FOR SESSION     Stretching        HS        Hip rotators        Hip flexors   3 x 20 sec  3 x 20 sec  3 x 20 sec       n   Mobilization      Massage DTM to left Medial HS  pt prone    Taping  prn      10/30/23 -          HOME EXERCISE PROGRAM  Created by Christine Mulvihill  Oct 30th, 2023  View videos at [email or web address]    5 Exercises    CALF STRETCH WITH TOWEL - GASTROCNEMIUS    While in a seated position, place a towel around the ball of your foot and pull your ankle back until a stretch is felt on your calf area.    Keep your knee in a straightened position during the stretch.    Video # [id number] Repeat 3 Times   Hold 20 Seconds   Complete 1 Set   Perform 2 Times a Day          CALF STRETCH WITH TOWEL - SOLEUS    While in a seated position, place a towel around the ball of your foot and pull your ankle back until a stretch is felt on your calf area.    Keep your knee in a bent position during the stretch.    Video # [id number] Repeat 3 Times   Hold 20 Seconds   Complete 1 Set   Perform 2 Times a Day          ANKLE PUMPS - AP    Bend your foot up and down at your ankle joint as shown.    Video # [id number] Repeat 3 Times   Hold 20 Seconds   Complete 1 Set   Perform 2 Times a Day          Ankle inversion eversion    With your legs out in front of you turn your feet in and then out Repeat 3 Times   Hold 20 Seconds   Perform 2 Times a Day          Runners Calf Stretch    1. Begin in a standing position with both hands against a wall and the legs staggered with the targeted leg behind.  2. Push the toes down into the ground in order to activate the calf muscles.  3. While maintaining this activation, press into the wall and stretch the back of the calf, flexing the ankle as far as possible. Repeat 3 Times   Hold 20 Seconds   Complete 1 Set   Perform 2 Times a Day                    10/18/23  Emailed HEP to [email or web address]     HOME EXERCISE PROGRAM  Created by Christine Mulvihill  Oct 18th, 2023  View videos at [email or web address]    4 Exercises    HAMSTRING STRETCH WITH TOWEL    While lying down on your back, hook a towel or strap under your foot and draw up your leg until a stretch is felt  "along the backside of your leg.    Keep your knee in a straightened position during the stretch.    Video # [id number] Repeat 3 Times   Hold 30 Seconds   Complete 1 Set   Perform 2 Times a Day          QUADRICEPS STRETCH - SIDE LYING    Lie on your side with your target limb on top. Next, grab your target limb below the knee and pull your knee into a more bent position until a stretch is felt along the front of your thigh. Repeat 3 Times   Hold 30 Seconds   Complete 1 Set   Perform 2 Times a Day          SUPINE HIP EXTERNAL ROTATION STRETCH - MODIFIED NOAH OR FIGURE 4    While lying on your back with your leg crossed over your knee, use your hand and push the crossed knee away from you as shown.    Video # [id number] Repeat 3 Times   Hold 30 Seconds   Complete 1 Set   Perform 2 Times a Day          Piriformis Stretch    Lie on your back with your uninvolved leg bent and the ankle of your involved leg crossed over the top (as shown). Grab the knee of the involved leg with both hands and stretch toward your chest and then over in the direction of the opposite shoulder. Hold the stretch for 15 seconds, then relax.    Do 5 - 15 second stretches on each leg. Repeat 3 Times   Hold 30 Seconds   Perform 2 Times                      Goals:   Goals      PT left leg/gait dysfunction Goals - R drop foot added 10/30/23          Short & Long Term Goals Time Frame Result Comment/Progress   Assess 6mWT 2 weeks   NT     Pt will be independent in primary HEP 4-6 weeks  Met for L  New for R     Patient pain will decrease from >5/10 to less <3/10 for all functional mobility  4-6 weeks   MET      Pt with decreased pain while \"walking fast\" with ability to ambulate on TM for 5 minutes at 2.5-3.0 mph.   4-6 weeks  NT     Pt will increase bilateral SLR by 5-10 deg to allow for improved stride length to normalize gait pattern. 4-6 weeks  MET     Pt will show improved strength of right HS, hip add/abd/ext by 1/2 grade to allow for improved " gait and tolerance for adls. 8-12 weeks       Increase LEFS >/= 9 points to increase function. 4-6 weeks       Increase LEFS >/= 18 points to increase function   8-12 weeks  Progress     Improve 30 sec STS to 13 reps or more without UE use to indicate improving LE functional strength. 8-12 weeks       Pt will be independent with advanced HEP to increase carryover at home 8-12 weeks       Pt will increase distance during 6mWT by 191 feet or more to meet the MCID indicating significant improvement in endurance. 4-6 weeks       Pt will increase distance during 6mWT by an additional 191 feet or more to meet the MCID indicating significant improvement in endurance. 8-12 weeks  NT     Pt will be properly referred to MD and orthotist for assessment for right LE AFO to improve gait dynamics to decrease left LE pain. 8-12 weeks MET 10/30/23 - appt set with Mani Gabriel Orthotist   Pt will have increased right dorsiflexion by 5 degrees to improve ability to perform more normalized gait cycle to reduce risk of falls. 2-4 weeks NEW 10/30/23 = -15   Pt will be able to perform 10 m walk test meeting age specific norms to determine improved gait 4-6 weeks NEW    Pt will be able to walk with use of right AFO without noted tripping to indicate improve gait and decreased fall risk. 4-6 weeks NEW    Pt will be able to perform a mini squat or golfers T to allow him to get into a low cabinet 4-6 weeks NEW    Pt will be independent in primary HEP for right ankle to assure carryover of exercises at home. 2-4 weeks NEW    Pt will be independent in advanced HEP for R ankle to assure carryover at home upon discharge. 4-6 weeks NEW    Pt will show overall improvement in function in relation to his right ankle as indicated by improvement on FAAM to 30% disability or less. 4-6 weeks NEW 10/30/23 = 69% disability                        This 78 y.o. year old male presents to PT with above stated diagnosis. Physical Therapy evaluation reveals  abnormal muscle tone, impaired communication, pain, decreased strength, decreased flexibility resulting in self-care, community/leisure, home management limitations. Les Garcia will benefit from skilled PT services to address limitation, work towards rehab and patient goals and maximize PLOF of chosen ADLs.     Planned Services: The patient's treatment will include  , .     Christine Blair Mulvihill, PT

## 2023-11-01 NOTE — OP PT TREATMENT LOG
ORTHO PT FLOWSHEET    Exercises Current Session Time y/n   MODALITIES- HEAT/ICE  CPT 12781 TOTAL TIME FOR SESSION Not performed    Heat     Ice/Vasocompression     MODALITIES - E-STIM  CPT 01560   TOTAL TIME FOR SESSION Not performed    E-stim/H-Wave     MODALITIES - US TOTAL TIME FOR SESSION Not performed    US (CPT 37760)     Iontophoresis (CPT 03256)     Mechanical Traction     THER ACT  CPT 18444 TOTAL TIME FOR SESSION 15 min    Pain, falls, med changes completed y   Pt education Pt educated regarding IE results and POC and need for active participation in skilled PT intervention and HEP.  Discussed with pt follow up with MD regarding possible right LE AFO to avoid further tripping and reinjury.  10/18/23 - reviewed possibility of AFO and pt shown normal hinged AFO, toe off and Spry step brace as options for dorsiflexion assistance in right LE to prevent further tripping/falling injuries.  Reviewed with pt heel toe gait pattern.  Also to discuss possibility of dorsiflexion assist brace vs/with trial of PT to improve mobility in right ankle as it is cause of left leg injury.   10/30/23 - pt education re:  Addition of right ankle to POC.  Instructed in HEP for right ankle.  See TE section.  Pt also educated in the use of his home EMS unit for contraction of anterior tibialis.  Set for 10 sec on/20 seconds off, 50 pps, 350 width.  Pt relayed understanding of set up and need to remove if burning or abnormal pain occurs       n             Assessment 10/30/23 - Reevaluation performed to add right foot drop/deviated gait to plan of care.  Results of right ankle evaluation as well as plan discussed with pt as well as need to monitor progress closely due to long nature of involvement of right ankle.  Also discussed improvement in left thigh and meeting with orthotist Mani Gabriel in future for assessment for possible AFO.  Pt agreeable to this and confirmed desire to meet with orthotist.    11/1/23 - performed 2MWT/6MWT  and 30 second STS.             Y   Body Mechanics/Work Training     THER EX  CPT 25225 TOTAL TIME FOR SESSION 25 minutes    CARDIOVASCULAR      Nu Step X 10 minutes [place] route   L1 Y   Recumbent Bike seat #24  x 10 minutes     Elliptical     Treadmill     HEP 10/30/23 - Pt instructed in a primary HEP for right ankle including ankle dorsiflexion/plantarflexion/inv/ev, calf towel stretch for gastroc and soleus and wall calf stretch.  See chart copy of HEP2Go sheet as issued to pt. Y   STRENGTHENING     Supine ther-ex       PB    Bridges       Bilateral knee fall outs       Ankle DF ecc control       Marching with TB  Prone       HS curls   2 x 10 5 sec     GTB  2 x 10 5 sec, Green band around knees  1 x 10, manual resistance    Right  Left LE   2/10   GTB  3 x 10   Y  Y    Y       Seated ther-ex       LAQ      STS      HS curls focus eccentric            nv  nv   Standing ther-ex       Hip flex       Hip ext       Hip abd       Hip add    nv  nv  nv  nv   STRETCHING     LE stretching       HS strap stretch       Piriformis KTOS       Figure 4 push away       Gastroc stretch   3 x 30 sec  3 x 30 sec  3 x 30 sec  3 x 30 sec R only   In supine Manual by therapist - discussed ankle bracing options and messaging primary PT re: orthotist appt during stretching   Y      Y   Other stretching     REPEATED MOVEMENTS     NEURO RE-ED  CPT 98218 TOTAL TIME FOR SESSION Not performed    COORDINATION     POSTURAL RE-ED        PRE-GAIT ACTIVITIES      BALANCE TRAINING      Sitting balance     Standing balance     GAIT TRAINING  CPT 58267 TOTAL TIME FOR SESSION Brief billed in TA    Ambulation  2/6MWT Y   Dynamic gait      Stair negotiation     Curb negotiation     Ramp negotiation     Outdoor ambulation     BWS/vector training     MANUAL  CPT 48167 TOTAL TIME FOR SESSION 15 min    Stretching        HS        Hip rotators        Hip flexors        Calf strap stretch   2 x 20 sec  2 x 20 sec  2 x 20 sec  2/30 sec   Y  Y  n  Y    Mobilization      Massage DTM to left Medial HS pt prone Y   Taping  prn      10/30/23 -          HOME EXERCISE PROGRAM  Created by Christine Mulvihill  Oct 30th, 2023  View videos at [email or web address]    5 Exercises    CALF STRETCH WITH TOWEL - GASTROCNEMIUS    While in a seated position, place a towel around the ball of your foot and pull your ankle back until a stretch is felt on your calf area.    Keep your knee in a straightened position during the stretch.    Video # [id number] Repeat 3 Times   Hold 20 Seconds   Complete 1 Set   Perform 2 Times a Day          CALF STRETCH WITH TOWEL - SOLEUS    While in a seated position, place a towel around the ball of your foot and pull your ankle back until a stretch is felt on your calf area.    Keep your knee in a bent position during the stretch.    Video # [id number] Repeat 3 Times   Hold 20 Seconds   Complete 1 Set   Perform 2 Times a Day          ANKLE PUMPS - AP    Bend your foot up and down at your ankle joint as shown.    Video # [id number] Repeat 3 Times   Hold 20 Seconds   Complete 1 Set   Perform 2 Times a Day          Ankle inversion eversion    With your legs out in front of you turn your feet in and then out Repeat 3 Times   Hold 20 Seconds   Perform 2 Times a Day          Runners Calf Stretch    1. Begin in a standing position with both hands against a wall and the legs staggered with the targeted leg behind.  2. Push the toes down into the ground in order to activate the calf muscles.  3. While maintaining this activation, press into the wall and stretch the back of the calf, flexing the ankle as far as possible. Repeat 3 Times   Hold 20 Seconds   Complete 1 Set   Perform 2 Times a Day                    10/18/23  Emailed HEP to [email or web address]     HOME EXERCISE PROGRAM  Created by Christine Mulvihill  Oct 18th, 2023  View videos at [email or web address]    4 Exercises    HAMSTRING STRETCH WITH TOWEL    While lying down on your back, hook a towel or strap under your  foot and draw up your leg until a stretch is felt along the backside of your leg.    Keep your knee in a straightened position during the stretch.    Video # [id number] Repeat 3 Times   Hold 30 Seconds   Complete 1 Set   Perform 2 Times a Day          QUADRICEPS STRETCH - SIDE LYING    Lie on your side with your target limb on top. Next, grab your target limb below the knee and pull your knee into a more bent position until a stretch is felt along the front of your thigh. Repeat 3 Times   Hold 30 Seconds   Complete 1 Set   Perform 2 Times a Day          SUPINE HIP EXTERNAL ROTATION STRETCH - MODIFIED NOAH OR FIGURE 4    While lying on your back with your leg crossed over your knee, use your hand and push the crossed knee away from you as shown.    Video # [id number] Repeat 3 Times   Hold 30 Seconds   Complete 1 Set   Perform 2 Times a Day          Piriformis Stretch    Lie on your back with your uninvolved leg bent and the ankle of your involved leg crossed over the top (as shown). Grab the knee of the involved leg with both hands and stretch toward your chest and then over in the direction of the opposite shoulder. Hold the stretch for 15 seconds, then relax.    Do 5 - 15 second stretches on each leg. Repeat 3 Times   Hold 30 Seconds   Perform 2 Times

## 2023-11-06 ENCOUNTER — HOSPITAL ENCOUNTER (OUTPATIENT)
Dept: PHYSICAL THERAPY | Facility: REHABILITATION | Age: 78
Setting detail: THERAPIES SERIES
Discharge: HOME | End: 2023-11-06
Attending: FAMILY MEDICINE
Payer: MEDICARE

## 2023-11-06 DIAGNOSIS — S70.12XA CONTUSION OF LEFT THIGH, INITIAL ENCOUNTER: Primary | ICD-10-CM

## 2023-11-06 DIAGNOSIS — M21.371 RIGHT FOOT DROP: ICD-10-CM

## 2023-11-06 PROCEDURE — 97530 THERAPEUTIC ACTIVITIES: CPT | Mod: GP,CQ

## 2023-11-06 PROCEDURE — 97110 THERAPEUTIC EXERCISES: CPT | Mod: GP,CQ

## 2023-11-06 NOTE — OP PT TREATMENT LOG
ORTHO PT FLOWSHEET    Exercises Current Session Time y/n   MODALITIES- HEAT/ICE  CPT 98331 TOTAL TIME FOR SESSION Not performed    Heat     Ice/Vasocompression     MODALITIES - E-STIM  CPT 76154   TOTAL TIME FOR SESSION Not performed    E-stim/H-Wave     MODALITIES - US TOTAL TIME FOR SESSION Not performed    US (CPT 10097)     Iontophoresis (CPT 84185)     Mechanical Traction     THER ACT  CPT 21831 TOTAL TIME FOR SESSION 10 minutes    Pain, falls, med changes completed y   Pt education Pt educated regarding IE results and POC and need for active participation in skilled PT intervention and HEP.  Discussed with pt follow up with MD regarding possible right LE AFO to avoid further tripping and reinjury.  10/18/23 - reviewed possibility of AFO and pt shown normal hinged AFO, toe off and Spry step brace as options for dorsiflexion assistance in right LE to prevent further tripping/falling injuries.  Reviewed with pt heel toe gait pattern.  Also to discuss possibility of dorsiflexion assist brace vs/with trial of PT to improve mobility in right ankle as it is cause of left leg injury.   10/30/23 - pt education re:  Addition of right ankle to POC.  Instructed in HEP for right ankle.  See TE section.  Pt also educated in the use of his home EMS unit for contraction of anterior tibialis.  Set for 10 sec on/20 seconds off, 50 pps, 350 width.  Pt relayed understanding of set up and need to remove if burning or abnormal pain occurs       n             Assessment 10/30/23 - Reevaluation performed to add right foot drop/deviated gait to plan of care.  Results of right ankle evaluation as well as plan discussed with pt as well as need to monitor progress closely due to long nature of involvement of right ankle.  Also discussed improvement in left thigh and meeting with orthotist Mani Gabriel in future for assessment for possible AFO.  Pt agreeable to this and confirmed desire to meet with orthotist.    11/1/23 - performed  "2MWT/6MWT and 30 second STS.             n   Body Mechanics/Work Training     THER EX  CPT 42654 TOTAL TIME FOR SESSION 50 minutes    CARDIOVASCULAR      Nu Step Level 6-3 x 10 minutes [place] route y   Recumbent Bike seat #24  x 10 minutes     Elliptical     Treadmill     HEP 10/30/23 - Pt instructed in a primary HEP for right ankle including ankle dorsiflexion/plantarflexion/inv/ev, calf towel stretch for gastroc and soleus and wall calf stretch.  See chart copy of HEP2Go sheet as issued to pt. n   STRENGTHENING     Supine ther-ex       Bridges       Bilateral knee fall outs       Marching with resistance       Ankle DF ecc control      Prone       HS curls   2 x 10 5 sec  Green band around knees  2 x 10 5 sec  Green band around knees  2 x 10 5 sec  Green band around knees  1 x 10 R foot manual resistance        3 x 10   y  y  y  y    n   Seated ther-ex      LAQ      STS      HS curls focus eccentric     10 x arms crossed over chest  10 x green band        y  y   Standing ther-ex       Hip flex       Hip abd       Hip ext       Hip add Add resistance next visit with pink band  2 x 10   2 x 10   2 x 10 standing on 4\" block with R hip extension to clear toes   2 x 10    y  y  y  n   STRETCHING     LE stretching       HS strap stretch       Piriformis KTOS       Figure 4 push away       Gastroc stretch   3 x 30 sec B  3 x 30 sec  3 x 30 sec  3 x 30 sec R only   In long sitting with strap    y      y   Other stretching     REPEATED MOVEMENTS     NEURO RE-ED  CPT 68656 TOTAL TIME FOR SESSION Not performed    COORDINATION     POSTURAL RE-ED        PRE-GAIT ACTIVITIES      BALANCE TRAINING      Sitting balance     Standing balance     GAIT TRAINING  CPT 25800 TOTAL TIME FOR SESSION Brief billed in TA    Ambulation  2/6MWT n   Dynamic gait      Stair negotiation     Curb negotiation     Ramp negotiation     Outdoor ambulation     BWS/vector training     MANUAL  CPT 42112 TOTAL TIME FOR SESSION Not performed  "   Stretching        HS        Hip rotators        Hip flexors        Calf strap stretch   2 x 20 sec  2 x 20 sec  2 x 20 sec  2/30 sec      Mobilization      Massage DTM to left Medial HS pt prone (Pt deferred - did not feel he needed it_ n   Taping  prn      10/30/23 -          HOME EXERCISE PROGRAM  Created by Christine Mulvihill  Oct 30th, 2023  View videos at [email or web address]    5 Exercises    CALF STRETCH WITH TOWEL - GASTROCNEMIUS    While in a seated position, place a towel around the ball of your foot and pull your ankle back until a stretch is felt on your calf area.    Keep your knee in a straightened position during the stretch.    Video # [id number] Repeat 3 Times   Hold 20 Seconds   Complete 1 Set   Perform 2 Times a Day          CALF STRETCH WITH TOWEL - SOLEUS    While in a seated position, place a towel around the ball of your foot and pull your ankle back until a stretch is felt on your calf area.    Keep your knee in a bent position during the stretch.    Video # [id number] Repeat 3 Times   Hold 20 Seconds   Complete 1 Set   Perform 2 Times a Day          ANKLE PUMPS - AP    Bend your foot up and down at your ankle joint as shown.    Video # [id number] Repeat 3 Times   Hold 20 Seconds   Complete 1 Set   Perform 2 Times a Day          Ankle inversion eversion    With your legs out in front of you turn your feet in and then out Repeat 3 Times   Hold 20 Seconds   Perform 2 Times a Day          Runners Calf Stretch    1. Begin in a standing position with both hands against a wall and the legs staggered with the targeted leg behind.  2. Push the toes down into the ground in order to activate the calf muscles.  3. While maintaining this activation, press into the wall and stretch the back of the calf, flexing the ankle as far as possible. Repeat 3 Times   Hold 20 Seconds   Complete 1 Set   Perform 2 Times a Day                    10/18/23  Emailed HEP to [email or web address]     HOME EXERCISE PROGRAM  Created  by Christine Mulvihill  Oct 18th, 2023  View videos at [email or web address]    4 Exercises    HAMSTRING STRETCH WITH TOWEL    While lying down on your back, hook a towel or strap under your foot and draw up your leg until a stretch is felt along the backside of your leg.    Keep your knee in a straightened position during the stretch.    Video # [id number] Repeat 3 Times   Hold 30 Seconds   Complete 1 Set   Perform 2 Times a Day          QUADRICEPS STRETCH - SIDE LYING    Lie on your side with your target limb on top. Next, grab your target limb below the knee and pull your knee into a more bent position until a stretch is felt along the front of your thigh. Repeat 3 Times   Hold 30 Seconds   Complete 1 Set   Perform 2 Times a Day          SUPINE HIP EXTERNAL ROTATION STRETCH - MODIFIED NOAH OR FIGURE 4    While lying on your back with your leg crossed over your knee, use your hand and push the crossed knee away from you as shown.    Video # [id number] Repeat 3 Times   Hold 30 Seconds   Complete 1 Set   Perform 2 Times a Day          Piriformis Stretch    Lie on your back with your uninvolved leg bent and the ankle of your involved leg crossed over the top (as shown). Grab the knee of the involved leg with both hands and stretch toward your chest and then over in the direction of the opposite shoulder. Hold the stretch for 15 seconds, then relax.    Do 5 - 15 second stretches on each leg. Repeat 3 Times   Hold 30 Seconds   Perform 2 Times

## 2023-11-06 NOTE — PROGRESS NOTES
Physical Therapy Visit    PT DAILY NOTE FOR OUTPATIENT THERAPY    Patient: Les Garcia MRN: [id number]  : 1945 78 y.o.  Referring Physician: Eduardo Virk MD  Date of Visit: 2023    Certification Dates: 10/30/23 through 24    Diagnosis:   1. Contusion of left thigh, initial encounter    2. Right foot drop        Chief Complaints:  pain and decreased adl functioning    Precautions:   Existing Precautions/Restrictions: cardiac  Precautions comments: MI, cardiac stent, right foot drop      TODAY'S VISIT    Time In Session:  Start Time: 1105  Stop Time: 1205  Time Calculation (min): 60 min   History/Vitals/Pain/Encounter Info - 23 1003        Injury History/Precautions/Daily Required Info    Document Type daily treatment     Primary Therapist Christine Mulvihill, PT     Chief Complaint/Reason for Visit  pain and decreased adl functioning     Onset of Illness/Injury or Date of Surgery 23     Referring Physician Eduardo Virk MD     Existing Precautions/Restrictions cardiac     Precautions comments MI, cardiac stent, right foot drop     History of present illness/functional impairment On  tripped on his right drop foot and twisted around landing on his left hip area.  Pt was seen in ED and special tests were performed (-) for fractures. Pt was away at the time on vacation so followed up with ortho upon return and was referred to outpatient PT. 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   He notes that he does have some dull pain lingering however it is overall much improved. adls:  increased pain with walking fast,  aching in leg with adls with associated decreased mobility.   Pts goals:  eliminate left Hamstring pain and improve mobility.  He would also like to not trip anymore so would like his right foot drop addressed at some point including possible AFOs.      Patient/Family/Caregiver Comments/Observations Les arrived with no pain. He still has intermittent discomfort in the hamstring when sitting on a hard seat where it pushes behind his thigh. No falls or changes in meds. No episodes of tripping since last session.     Patient reported fall since last visit No        Pain Assessment    Currently in pain No/Denies     Preferred Pain Scale --        Pain Intervention    Intervention  exercise     Post Intervention Comments No pain         Services    Do You Speak a Language Other Than English at Home? no                Daily Treatment Assessment and Plan - 11/06/23 1100        Daily Treatment Assessment and Plan    Progress toward goals Progressing     Daily Outcome Summary Les only reports intermittent discomfort in his hamstring. Focused session on hamstring and hip strengthening as well as addition of ankle treatment.     Plan and Recommendations Continue with POC per primary PT.                       Today's Treatment:    ORTHO PT FLOWSHEET    Exercises Current Session Time y/n   MODALITIES- HEAT/ICE  CPT 00243 TOTAL TIME FOR SESSION Not performed    Heat     Ice/Vasocompression     MODALITIES - E-STIM  CPT 27281   TOTAL TIME FOR SESSION Not performed    E-stim/H-Wave     MODALITIES - US TOTAL TIME FOR SESSION Not performed    US (CPT 32856)     Iontophoresis (CPT 31440)     Mechanical Traction     THER ACT  CPT 81002 TOTAL TIME FOR SESSION 10 minutes    Pain, falls, med changes completed y   Pt education Pt educated regarding IE results and POC and need for active participation in skilled PT intervention and HEP.  Discussed with pt follow up with MD regarding possible right LE AFO to avoid further tripping and reinjury.  10/18/23 - reviewed possibility of AFO and pt shown normal hinged AFO, toe off and Spry step brace as options for dorsiflexion assistance in right LE to prevent further tripping/falling injuries.  Reviewed with pt heel toe gait  pattern.  Also to discuss possibility of dorsiflexion assist brace vs/with trial of PT to improve mobility in right ankle as it is cause of left leg injury.   10/30/23 - pt education re:  Addition of right ankle to POC.  Instructed in HEP for right ankle.  See TE section.  Pt also educated in the use of his home EMS unit for contraction of anterior tibialis.  Set for 10 sec on/20 seconds off, 50 pps, 350 width.  Pt relayed understanding of set up and need to remove if burning or abnormal pain occurs       n             Assessment 10/30/23 - Reevaluation performed to add right foot drop/deviated gait to plan of care.  Results of right ankle evaluation as well as plan discussed with pt as well as need to monitor progress closely due to long nature of involvement of right ankle.  Also discussed improvement in left thigh and meeting with orthotist Mani Gabriel in future for assessment for possible AFO.  Pt agreeable to this and confirmed desire to meet with orthotist.    11/1/23 - performed 2MWT/6MWT and 30 second STS.             n   Body Mechanics/Work Training     THER EX  CPT 21284 TOTAL TIME FOR SESSION 50 minutes    CARDIOVASCULAR      Nu Step Level 6-3 x 10 minutes [facility] route y   Recumbent Bike seat #24  x 10 minutes     Elliptical     Treadmill     HEP 10/30/23 - Pt instructed in a primary HEP for right ankle including ankle dorsiflexion/plantarflexion/inv/ev, calf towel stretch for gastroc and soleus and wall calf stretch.  See chart copy of HEP2Go sheet as issued to pt. n   STRENGTHENING     Supine ther-ex       Bridges       Bilateral knee fall outs       Marching with resistance       Ankle DF ecc control      Prone       HS curls   2 x 10 5 sec  Green band around knees  2 x 10 5 sec  Green band around knees  2 x 10 5 sec  Green band around knees  1 x 10 R foot manual resistance        3 x 10   y  y  y  y    n   Seated ther-ex      LAQ      STS      HS curls focus eccentric     10 x arms crossed  "over chest  10 x green band        y  y   Standing ther-ex       Hip flex       Hip abd       Hip ext       Hip add Add resistance next visit with pink band  2 x 10   2 x 10   2 x 10 standing on 4\" block with R hip extension to clear toes   2 x 10    y  y  y  n   STRETCHING     LE stretching       HS strap stretch       Piriformis KTOS       Figure 4 push away       Gastroc stretch   3 x 30 sec B  3 x 30 sec  3 x 30 sec  3 x 30 sec R only   In long sitting with strap    y      y   Other stretching     REPEATED MOVEMENTS     NEURO RE-ED  CPT 91704 TOTAL TIME FOR SESSION Not performed    COORDINATION     POSTURAL RE-ED        PRE-GAIT ACTIVITIES      BALANCE TRAINING      Sitting balance     Standing balance     GAIT TRAINING  CPT 18544 TOTAL TIME FOR SESSION Brief billed in TA    Ambulation  2/6MWT n   Dynamic gait      Stair negotiation     Curb negotiation     Ramp negotiation     Outdoor ambulation     BWS/vector training     MANUAL  CPT 09415 TOTAL TIME FOR SESSION Not performed    Stretching        HS        Hip rotators        Hip flexors        Calf strap stretch   2 x 20 sec  2 x 20 sec  2 x 20 sec  2/30 sec      Mobilization      Massage DTM to left Medial HS pt prone (Pt deferred - did not feel he needed it_ n   Taping  prn      10/30/23 -          HOME EXERCISE PROGRAM  Created by Christine Mulvihill  Oct 30th, 2023  View videos at [email or web address]    5 Exercises    CALF STRETCH WITH TOWEL - GASTROCNEMIUS    While in a seated position, place a towel around the ball of your foot and pull your ankle back until a stretch is felt on your calf area.    Keep your knee in a straightened position during the stretch.    Video # [id number] Repeat 3 Times   Hold 20 Seconds   Complete 1 Set   Perform 2 Times a Day          CALF STRETCH WITH TOWEL - SOLEUS    While in a seated position, place a towel around the ball of your foot and pull your ankle back until a stretch is felt on your calf area.    Keep your knee in a bent " position during the stretch.    Video # [id number] Repeat 3 Times   Hold 20 Seconds   Complete 1 Set   Perform 2 Times a Day          ANKLE PUMPS - AP    Bend your foot up and down at your ankle joint as shown.    Video # [id number] Repeat 3 Times   Hold 20 Seconds   Complete 1 Set   Perform 2 Times a Day          Ankle inversion eversion    With your legs out in front of you turn your feet in and then out Repeat 3 Times   Hold 20 Seconds   Perform 2 Times a Day          Runners Calf Stretch    1. Begin in a standing position with both hands against a wall and the legs staggered with the targeted leg behind.  2. Push the toes down into the ground in order to activate the calf muscles.  3. While maintaining this activation, press into the wall and stretch the back of the calf, flexing the ankle as far as possible. Repeat 3 Times   Hold 20 Seconds   Complete 1 Set   Perform 2 Times a Day                    10/18/23  Emailed HEP to [email or web address]     HOME EXERCISE PROGRAM  Created by Christine Mulvihill  Oct 18th, 2023  View videos at [email or web address]    4 Exercises    HAMSTRING STRETCH WITH TOWEL    While lying down on your back, hook a towel or strap under your foot and draw up your leg until a stretch is felt along the backside of your leg.    Keep your knee in a straightened position during the stretch.    Video # [id number] Repeat 3 Times   Hold 30 Seconds   Complete 1 Set   Perform 2 Times a Day          QUADRICEPS STRETCH - SIDE LYING    Lie on your side with your target limb on top. Next, grab your target limb below the knee and pull your knee into a more bent position until a stretch is felt along the front of your thigh. Repeat 3 Times   Hold 30 Seconds   Complete 1 Set   Perform 2 Times a Day          SUPINE HIP EXTERNAL ROTATION STRETCH - MODIFIED NOAH OR FIGURE 4    While lying on your back with your leg crossed over your knee, use your hand and push the crossed knee away from you as shown.    Video #  [id number] Repeat 3 Times   Hold 30 Seconds   Complete 1 Set   Perform 2 Times a Day          Piriformis Stretch    Lie on your back with your uninvolved leg bent and the ankle of your involved leg crossed over the top (as shown). Grab the knee of the involved leg with both hands and stretch toward your chest and then over in the direction of the opposite shoulder. Hold the stretch for 15 seconds, then relax.    Do 5 - 15 second stretches on each leg. Repeat 3 Times   Hold 30 Seconds   Perform 2 Times

## 2023-11-09 ENCOUNTER — HOSPITAL ENCOUNTER (OUTPATIENT)
Dept: PHYSICAL THERAPY | Facility: REHABILITATION | Age: 78
Setting detail: THERAPIES SERIES
Discharge: HOME | End: 2023-11-09
Attending: FAMILY MEDICINE
Payer: MEDICARE

## 2023-11-09 DIAGNOSIS — R26.2 DIFFICULTY IN WALKING: ICD-10-CM

## 2023-11-09 DIAGNOSIS — S70.12XA CONTUSION OF LEFT THIGH, INITIAL ENCOUNTER: Primary | ICD-10-CM

## 2023-11-09 DIAGNOSIS — M21.371 RIGHT FOOT DROP: ICD-10-CM

## 2023-11-09 PROCEDURE — 97140 MANUAL THERAPY 1/> REGIONS: CPT | Mod: GP

## 2023-11-09 PROCEDURE — 97110 THERAPEUTIC EXERCISES: CPT | Mod: GP

## 2023-11-09 PROCEDURE — 97530 THERAPEUTIC ACTIVITIES: CPT | Mod: GP

## 2023-11-09 NOTE — OP PT TREATMENT LOG
ORTHO PT FLOWSHEET    Exercises Current Session Time y/n   MODALITIES- HEAT/ICE  CPT 99316 TOTAL TIME FOR SESSION Not performed    Heat     Ice/Vasocompression     MODALITIES - E-STIM  CPT 46314   TOTAL TIME FOR SESSION Not performed    E-stim/H-Wave     MODALITIES - US TOTAL TIME FOR SESSION Not performed    US (CPT 39725)     Iontophoresis (CPT 31130)     Mechanical Traction     THER ACT  CPT 15897 TOTAL TIME FOR SESSION 10 minutes    Pain, falls, med changes completed y   Pt education Pt educated regarding IE results and POC and need for active participation in skilled PT intervention and HEP.  Discussed with pt follow up with MD regarding possible right LE AFO to avoid further tripping and reinjury.  10/18/23 - reviewed possibility of AFO and pt shown normal hinged AFO, toe off and Spry step brace as options for dorsiflexion assistance in right LE to prevent further tripping/falling injuries.  Reviewed with pt heel toe gait pattern.  Also to discuss possibility of dorsiflexion assist brace vs/with trial of PT to improve mobility in right ankle as it is cause of left leg injury.   10/30/23 - pt education re:  Addition of right ankle to POC.  Instructed in HEP for right ankle.  See TE section.  Pt also educated in the use of his home EMS unit for contraction of anterior tibialis.  Set for 10 sec on/20 seconds off, 50 pps, 350 width.  Pt relayed understanding of set up and need to remove if burning or abnormal pain occurs       n             Assessment 10/30/23 - Reevaluation performed to add right foot drop/deviated gait to plan of care.  Results of right ankle evaluation as well as plan discussed with pt as well as need to monitor progress closely due to long nature of involvement of right ankle.  Also discussed improvement in left thigh and meeting with orthotist Mani Gabriel in future for assessment for possible AFO.  Pt agreeable to this and confirmed desire to meet with orthotist.    11/1/23 - performed  "2MWT/6MWT and 30 second STS.             n   Body Mechanics/Work Training     THER EX  CPT 65415 TOTAL TIME FOR SESSION 35 minutes    CARDIOVASCULAR      Nu Step Level 6-3 x 10 minutes [facility]    Recumbent Bike seat #24  x 10 minutes   Cook Sta loop  Gear 9     Y   Elliptical     Treadmill     HEP 10/30/23 - Pt instructed in a primary HEP for right ankle including ankle dorsiflexion/plantarflexion/inv/ev, calf towel stretch for gastroc and soleus and wall calf stretch.  See chart copy of HEP2Go sheet as issued to pt. n   STRENGTHENING     Supine ther-ex       Bridges       Bilateral knee fall outs       Marching with resistance       Ankle DF ecc control      Prone       HS curls   2 x 10 5 sec  Green band around knees  2 x 10 5 sec  Green band around knees  2 x 10 5 sec  Green band around knees  1 x 10 R foot manual resistance        3 x 10   Y  Y  Y  y    n   Seated ther-ex      LAQ      STS      HS curls focus eccentric     10 x arms crossed over chest  10 x green band             Standing ther-ex       Hip flex       Hip abd       Hip ext       Hip add Add resistance next visit with pink band  2 x 10   2 x 10   2 x 10 standing on 4\" block with R hip extension to clear toes   2 x 10          n   STRETCHING     LE stretching       HS strap stretch       Piriformis KTOS       Figure 4 push away       Gastroc stretch   3 x 30 sec B  3 x 30 sec  3 x 30 sec  3 x 30 sec R only   In long sitting with strap - knee flex and knee ext   Y      Y   Other stretching       Incline calf stretch       REPEATED MOVEMENTS     NEURO RE-ED  CPT 89344 TOTAL TIME FOR SESSION Not performed    COORDINATION     POSTURAL RE-ED        PRE-GAIT ACTIVITIES      BALANCE TRAINING      Sitting balance     Standing balance     GAIT TRAINING  CPT 91660 TOTAL TIME FOR SESSION Brief billed in TA    Ambulation  2/6MWT n   Dynamic gait      Stair negotiation     Curb negotiation     Ramp negotiation     Outdoor ambulation     BWS/vector " training     MANUAL  CPT 92363 TOTAL TIME FOR SESSION 15 min    Stretching        HS        Hip rotators        Hip flexors        Calf strap stretch  PROM right ankle focus on        dorsi/eversion   2 x 20 sec  2 x 20 sec  2 x 20 sec  2/30 sec  5/20 sec   Y  Y  Y  Y  Y   Mobilization        TC joint P   10   Y   Massage DTM to left Medial HS pt prone (Pt deferred - did not feel he needed it_ Y   Taping  prn      10/30/23 -          HOME EXERCISE PROGRAM  Created by Christine Mulvihill  Oct 30th, 2023  View videos at [email or web address]    5 Exercises    CALF STRETCH WITH TOWEL - GASTROCNEMIUS    While in a seated position, place a towel around the ball of your foot and pull your ankle back until a stretch is felt on your calf area.    Keep your knee in a straightened position during the stretch.    Video # [id number] Repeat 3 Times   Hold 20 Seconds   Complete 1 Set   Perform 2 Times a Day          CALF STRETCH WITH TOWEL - SOLEUS    While in a seated position, place a towel around the ball of your foot and pull your ankle back until a stretch is felt on your calf area.    Keep your knee in a bent position during the stretch.    Video # [id number] Repeat 3 Times   Hold 20 Seconds   Complete 1 Set   Perform 2 Times a Day          ANKLE PUMPS - AP    Bend your foot up and down at your ankle joint as shown.    Video # [id number] Repeat 3 Times   Hold 20 Seconds   Complete 1 Set   Perform 2 Times a Day          Ankle inversion eversion    With your legs out in front of you turn your feet in and then out Repeat 3 Times   Hold 20 Seconds   Perform 2 Times a Day          Runners Calf Stretch    1. Begin in a standing position with both hands against a wall and the legs staggered with the targeted leg behind.  2. Push the toes down into the ground in order to activate the calf muscles.  3. While maintaining this activation, press into the wall and stretch the back of the calf, flexing the ankle as far as possible. Repeat 3 Times    Hold 20 Seconds   Complete 1 Set   Perform 2 Times a Day                    10/18/23  Emailed HEP to [email or web address]     HOME EXERCISE PROGRAM  Created by Christine Mulvihill  Oct 18th, 2023  View videos at [email or web address]    4 Exercises    HAMSTRING STRETCH WITH TOWEL    While lying down on your back, hook a towel or strap under your foot and draw up your leg until a stretch is felt along the backside of your leg.    Keep your knee in a straightened position during the stretch.    Video # [id number] Repeat 3 Times   Hold 30 Seconds   Complete 1 Set   Perform 2 Times a Day          QUADRICEPS STRETCH - SIDE LYING    Lie on your side with your target limb on top. Next, grab your target limb below the knee and pull your knee into a more bent position until a stretch is felt along the front of your thigh. Repeat 3 Times   Hold 30 Seconds   Complete 1 Set   Perform 2 Times a Day          SUPINE HIP EXTERNAL ROTATION STRETCH - MODIFIED NOAH OR FIGURE 4    While lying on your back with your leg crossed over your knee, use your hand and push the crossed knee away from you as shown.    Video # [id number] Repeat 3 Times   Hold 30 Seconds   Complete 1 Set   Perform 2 Times a Day          Piriformis Stretch    Lie on your back with your uninvolved leg bent and the ankle of your involved leg crossed over the top (as shown). Grab the knee of the involved leg with both hands and stretch toward your chest and then over in the direction of the opposite shoulder. Hold the stretch for 15 seconds, then relax.    Do 5 - 15 second stretches on each leg. Repeat 3 Times   Hold 30 Seconds   Perform 2 Times

## 2023-11-10 NOTE — PROGRESS NOTES
Physical Therapy Visit    PT DAILY NOTE FOR OUTPATIENT THERAPY    Patient: Les Garcia MRN: [id number]  : 1945 78 y.o.  Referring Physician: Eduardo Virk MD  Date of Visit: 2023    Certification Dates: 10/30/23 through 24    Diagnosis:   1. Contusion of left thigh, initial encounter    2. Right foot drop    3. Difficulty in walking        Chief Complaints:  pain and decreased adl functioning    Precautions:   Existing Precautions/Restrictions: cardiac  Precautions comments: MI, cardiac stent, right foot drop      TODAY'S VISIT    Time In Session:  Start Time: 1100  Stop Time: 1200  Time Calculation (min): 60 min   History/Vitals/Pain/Encounter Info - 23 1105        Injury History/Precautions/Daily Required Info    Document Type daily treatment     Primary Therapist Christine Mulvihill, PT     Chief Complaint/Reason for Visit  pain and decreased adl functioning     Onset of Illness/Injury or Date of Surgery 23     Referring Physician Eduardo Virk MD     Existing Precautions/Restrictions cardiac     Precautions comments MI, cardiac stent, right foot drop     History of present illness/functional impairment On  tripped on his right drop foot and twisted around landing on his left hip area.  Pt was seen in ED and special tests were performed (-) for fractures. Pt was away at the time on vacation so followed up with ortho upon return and was referred to outpatient PT. 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   He notes that he does have some dull pain lingering however it is overall much improved. adls:  increased pain with walking fast,  aching in leg with adls with associated decreased mobility.   Pts goals:  eliminate left Hamstring pain and improve mobility.  He would also like to not trip anymore so would like his right foot drop addressed at some  point including possible AFOs.     Patient/Family/Caregiver Comments/Observations Les notes that his HS hurts at times however is now intermittent.  He feels that he has been walking better since initiating PT.     Patient reported fall since last visit No        Pain Assessment    Currently in pain Yes     Preferred Pain Scale number (Numeric Rating Pain Scale)     Pain Side/Orientation left     Pain: Body location Leg     Pain Rating (0-10): Pre Activity 2     Pain Rating (0-10): Post Activity 1        Pain Intervention    Intervention  TE, MT, TA     Post Intervention Comments pain decreased post treatment.         Services    Do You Speak a Language Other Than English at Home? no                Daily Treatment Assessment and Plan - 11/09/23 1105        Daily Treatment Assessment and Plan    Progress toward goals Progressing     Daily Outcome Summary No noted improvement in dorsiflexion today however HS responding well to PT.  Pt also notes subjectively that his ankle feels more mobile since starting right ankle therapy.  Will benefit from assessment from prosthetist to see if an AFO is indicated at this time.     Plan and Recommendations Continue with POC per primary PT.  Await  prosthetist assessment.                     OBJECTIVE DATA TAKEN TODAY:    None taken    Today's Treatment:    ORTHO PT FLOWSHEET    Exercises Current Session Time y/n   MODALITIES- HEAT/ICE  CPT 43427 TOTAL TIME FOR SESSION Not performed    Heat     Ice/Vasocompression     MODALITIES - E-STIM  CPT 94101   TOTAL TIME FOR SESSION Not performed    E-stim/H-Wave     MODALITIES - US TOTAL TIME FOR SESSION Not performed    US (CPT 44886)     Iontophoresis (CPT 43676)     Mechanical Traction     THER ACT  CPT 37600 TOTAL TIME FOR SESSION 10 minutes    Pain, falls, med changes completed y   Pt education Pt educated regarding IE results and POC and need for active participation in skilled PT intervention and HEP.  Discussed with pt  follow up with MD regarding possible right LE AFO to avoid further tripping and reinjury.  10/18/23 - reviewed possibility of AFO and pt shown normal hinged AFO, toe off and Spry step brace as options for dorsiflexion assistance in right LE to prevent further tripping/falling injuries.  Reviewed with pt heel toe gait pattern.  Also to discuss possibility of dorsiflexion assist brace vs/with trial of PT to improve mobility in right ankle as it is cause of left leg injury.   10/30/23 - pt education re:  Addition of right ankle to POC.  Instructed in HEP for right ankle.  See TE section.  Pt also educated in the use of his home EMS unit for contraction of anterior tibialis.  Set for 10 sec on/20 seconds off, 50 pps, 350 width.  Pt relayed understanding of set up and need to remove if burning or abnormal pain occurs       n             Assessment 10/30/23 - Reevaluation performed to add right foot drop/deviated gait to plan of care.  Results of right ankle evaluation as well as plan discussed with pt as well as need to monitor progress closely due to long nature of involvement of right ankle.  Also discussed improvement in left thigh and meeting with orthotist Mani Gabriel in future for assessment for possible AFO.  Pt agreeable to this and confirmed desire to meet with orthotist.    11/1/23 - performed 2MWT/6MWT and 30 second STS.             n   Body Mechanics/Work Training     THER EX  CPT 51430 TOTAL TIME FOR SESSION 35 minutes    CARDIOVASCULAR      Nu Step Level 6-3 x 10 minutes [facility]    Recumbent Bike seat #24  x 10 minutes   Sawyer loop  Gear 9     Y   Elliptical     Treadmill     HEP 10/30/23 - Pt instructed in a primary HEP for right ankle including ankle dorsiflexion/plantarflexion/inv/ev, calf towel stretch for gastroc and soleus and wall calf stretch.  See chart copy of HEP2Go sheet as issued to pt. n   STRENGTHENING     Supine ther-ex       Bridges       Bilateral knee fall outs        "Marching with resistance       Ankle DF ecc control      Prone       HS curls   2 x 10 5 sec  Green band around knees  2 x 10 5 sec  Green band around knees  2 x 10 5 sec  Green band around knees  1 x 10 R foot manual resistance        3 x 10   Y  Y  Y  y    n   Seated ther-ex      LAQ      STS      HS curls focus eccentric     10 x arms crossed over chest  10 x green band             Standing ther-ex       Hip flex       Hip abd       Hip ext       Hip add Add resistance next visit with pink band  2 x 10   2 x 10   2 x 10 standing on 4\" block with R hip extension to clear toes   2 x 10          n   STRETCHING     LE stretching       HS strap stretch       Piriformis KTOS       Figure 4 push away       Gastroc stretch   3 x 30 sec B  3 x 30 sec  3 x 30 sec  3 x 30 sec R only   In long sitting with strap - knee flex and knee ext   Y      Y   Other stretching       Incline calf stretch       REPEATED MOVEMENTS     NEURO RE-ED  CPT 15298 TOTAL TIME FOR SESSION Not performed    COORDINATION     POSTURAL RE-ED        PRE-GAIT ACTIVITIES      BALANCE TRAINING      Sitting balance     Standing balance     GAIT TRAINING  CPT 20357 TOTAL TIME FOR SESSION Brief billed in TA    Ambulation  2/6MWT n   Dynamic gait      Stair negotiation     Curb negotiation     Ramp negotiation     Outdoor ambulation     BWS/vector training     MANUAL  CPT 10520 TOTAL TIME FOR SESSION 15 min    Stretching        HS        Hip rotators        Hip flexors        Calf strap stretch  PROM right ankle focus on        dorsi/eversion   2 x 20 sec  2 x 20 sec  2 x 20 sec  2/30 sec  5/20 sec   Y  Y  Y  Y  Y   Mobilization        TC joint P   10   Y   Massage DTM to left Medial HS pt prone (Pt deferred - did not feel he needed it_ Y   Taping  prn      10/30/23 -          HOME EXERCISE PROGRAM  Created by Christine Mulvihill  Oct 30th, 2023  View videos at [email or web address]    5 Exercises    CALF STRETCH WITH TOWEL - GASTROCNEMIUS    While in a seated " position, place a towel around the ball of your foot and pull your ankle back until a stretch is felt on your calf area.    Keep your knee in a straightened position during the stretch.    Video # [id number] Repeat 3 Times   Hold 20 Seconds   Complete 1 Set   Perform 2 Times a Day          CALF STRETCH WITH TOWEL - SOLEUS    While in a seated position, place a towel around the ball of your foot and pull your ankle back until a stretch is felt on your calf area.    Keep your knee in a bent position during the stretch.    Video # [id number] Repeat 3 Times   Hold 20 Seconds   Complete 1 Set   Perform 2 Times a Day          ANKLE PUMPS - AP    Bend your foot up and down at your ankle joint as shown.    Video # [id number] Repeat 3 Times   Hold 20 Seconds   Complete 1 Set   Perform 2 Times a Day          Ankle inversion eversion    With your legs out in front of you turn your feet in and then out Repeat 3 Times   Hold 20 Seconds   Perform 2 Times a Day          Runners Calf Stretch    1. Begin in a standing position with both hands against a wall and the legs staggered with the targeted leg behind.  2. Push the toes down into the ground in order to activate the calf muscles.  3. While maintaining this activation, press into the wall and stretch the back of the calf, flexing the ankle as far as possible. Repeat 3 Times   Hold 20 Seconds   Complete 1 Set   Perform 2 Times a Day                    10/18/23  Emailed HEP to [email or web address]     HOME EXERCISE PROGRAM  Created by Christine Mulvihill  Oct 18th, 2023  View videos at [email or web address]    4 Exercises    HAMSTRING STRETCH WITH TOWEL    While lying down on your back, hook a towel or strap under your foot and draw up your leg until a stretch is felt along the backside of your leg.    Keep your knee in a straightened position during the stretch.    Video # [id number] Repeat 3 Times   Hold 30 Seconds   Complete 1 Set   Perform 2 Times a Day          QUADRICEPS STRETCH - SIDE  LYING    Lie on your side with your target limb on top. Next, grab your target limb below the knee and pull your knee into a more bent position until a stretch is felt along the front of your thigh. Repeat 3 Times   Hold 30 Seconds   Complete 1 Set   Perform 2 Times a Day          SUPINE HIP EXTERNAL ROTATION STRETCH - MODIFIED NOAH OR FIGURE 4    While lying on your back with your leg crossed over your knee, use your hand and push the crossed knee away from you as shown.    Video # [id number] Repeat 3 Times   Hold 30 Seconds   Complete 1 Set   Perform 2 Times a Day          Piriformis Stretch    Lie on your back with your uninvolved leg bent and the ankle of your involved leg crossed over the top (as shown). Grab the knee of the involved leg with both hands and stretch toward your chest and then over in the direction of the opposite shoulder. Hold the stretch for 15 seconds, then relax.    Do 5 - 15 second stretches on each leg. Repeat 3 Times   Hold 30 Seconds   Perform 2 Times

## 2023-11-15 ENCOUNTER — HOSPITAL ENCOUNTER (OUTPATIENT)
Dept: PHYSICAL THERAPY | Facility: REHABILITATION | Age: 78
Setting detail: THERAPIES SERIES
Discharge: HOME | End: 2023-11-15
Attending: FAMILY MEDICINE
Payer: MEDICARE

## 2023-11-15 DIAGNOSIS — R26.2 DIFFICULTY IN WALKING: ICD-10-CM

## 2023-11-15 DIAGNOSIS — M21.371 RIGHT FOOT DROP: ICD-10-CM

## 2023-11-15 DIAGNOSIS — S70.12XA CONTUSION OF LEFT THIGH, INITIAL ENCOUNTER: Primary | ICD-10-CM

## 2023-11-15 PROCEDURE — 97530 THERAPEUTIC ACTIVITIES: CPT | Mod: GP

## 2023-11-15 PROCEDURE — 97110 THERAPEUTIC EXERCISES: CPT | Mod: GP

## 2023-11-15 PROCEDURE — 97140 MANUAL THERAPY 1/> REGIONS: CPT | Mod: GP

## 2023-11-15 NOTE — OP PT TREATMENT LOG
ORTHO PT FLOWSHEET    Exercises Current Session Time y/n   MODALITIES- HEAT/ICE  CPT 32762 TOTAL TIME FOR SESSION Not performed    Heat     Ice/Vasocompression     MODALITIES - E-STIM  CPT 32578   TOTAL TIME FOR SESSION Not performed    E-stim/H-Wave     MODALITIES - US TOTAL TIME FOR SESSION Not performed    US (CPT 02354)     Iontophoresis (CPT 09068)     Mechanical Traction     THER ACT  CPT 75061 TOTAL TIME FOR SESSION 20 minutes    Pain, falls, med changes completed Y   Pt education Pt educated regarding IE results and POC and need for active participation in skilled PT intervention and HEP.  Discussed with pt follow up with MD regarding possible right LE AFO to avoid further tripping and reinjury.  10/18/23 - reviewed possibility of AFO and pt shown normal hinged AFO, toe off and Spry step brace as options for dorsiflexion assistance in right LE to prevent further tripping/falling injuries.  Reviewed with pt heel toe gait pattern.  Also to discuss possibility of dorsiflexion assist brace vs/with trial of PT to improve mobility in right ankle as it is cause of left leg injury.   10/30/23 - pt education re:  Addition of right ankle to POC.  Instructed in HEP for right ankle.  See TE section.  Pt also educated in the use of his home EMS unit for contraction of anterior tibialis.  Set for 10 sec on/20 seconds off, 50 pps, 350 width.  Pt relayed understanding of set up and need to remove if burning or abnormal pain occurs  11/15/23 - reviewed with pt placement for electrodes for NMES to anterior tibialis on right LE to improve dorsiflexion.  Pt emailed picture of motor point with small electrode on proximal placement/motor  Point and larger pad on distal point as dispersive pad.  Pt appeared to understand.  Also discussed his visit with orthotist and he notes that he will most likely but a fabrid ankle strap with strap to attach to shoe laces to facilitate dorsiflexion and Mani Gabriel the prosthetist is going to  "start the process to get him a carbon graphite AFO.                           Y   Assessment 10/30/23 - Reevaluation performed to add right foot drop/deviated gait to plan of care.  Results of right ankle evaluation as well as plan discussed with pt as well as need to monitor progress closely due to long nature of involvement of right ankle.  Also discussed improvement in left thigh and meeting with orthotist Mani Gabriel in future for assessment for possible AFO.  Pt agreeable to this and confirmed desire to meet with orthotist.    11/1/23 - performed 2MWT/6MWT and 30 second STS.             n   Body Mechanics/Work Training     THER EX  CPT 02960 TOTAL TIME FOR SESSION 25 minutes    CARDIOVASCULAR      Nu Step Level 6-3 x 10 minutes [facility]    Recumbent Bike seat #24  x 10 minutes   [employer] loop  Gear 9     Y   Elliptical     Treadmill     HEP 10/30/23 - Pt instructed in a primary HEP for right ankle including ankle dorsiflexion/plantarflexion/inv/ev, calf towel stretch for gastroc and soleus and wall calf stretch.  See chart copy of HEP2Go sheet as issued to pt. n   STRENGTHENING     Supine ther-ex       Bridges       Bilateral knee fall outs       Marching with resistance       Ankle DF ecc control      Prone       HS curls   2 x 10 5 sec  Green band around knees  2 x 10 5 sec  Green band around knees  2 x 10 5 sec  Green band around knees  1 x 10 R foot manual resistance        3 x 10   OTB focus on eccentric control   Y  Y  Y  Y    Y   Seated ther-ex      LAQ      STS      HS curls focus eccentric     10 x arms crossed over chest  10 x green band             Standing ther-ex       Hip flex       Hip abd       Hip ext       Hip add Add resistance next visit with pink band  2 x 10   2 x 10   2 x 10 standing on 4\" block with R hip extension to clear toes   2 x 10          n   STRETCHING     LE stretching       HS strap stretch       Piriformis KTOS       Figure 4 push away       Gastroc stretch   3 x " 30 sec B  3 x 30 sec  3 x 30 sec  3 x 30 sec R only   In long sitting with strap - knee flex and knee ext            Other stretching       Incline calf stretch       REPEATED MOVEMENTS     NEURO RE-ED  CPT 32005 TOTAL TIME FOR SESSION Not performed    COORDINATION     POSTURAL RE-ED        PRE-GAIT ACTIVITIES      BALANCE TRAINING      Sitting balance     Standing balance     GAIT TRAINING  CPT 93039 TOTAL TIME FOR SESSION Brief billed in TA    Ambulation  2/6MWT n   Dynamic gait      Stair negotiation     Curb negotiation     Ramp negotiation     Outdoor ambulation     BWS/vector training     MANUAL  CPT 83633 TOTAL TIME FOR SESSION 15 min    Stretching        HS        Hip rotators        Hip flexors        Calf strap stretch  PROM right ankle focus on        dorsi/eversion   2 x 20 sec  2 x 20 sec  2 x 20 sec  2/30 sec  5/20 sec   Y  Y  Y  Y  Y   Mobilization        TC joint    10   Y   Massage DTM to left Medial HS pt prone _ Y   Taping  prn      10/30/23 -          HOME EXERCISE PROGRAM  Created by Christine Mulvihill  Oct 30th, 2023  View videos at [email or web address]    5 Exercises    CALF STRETCH WITH TOWEL - GASTROCNEMIUS    While in a seated position, place a towel around the ball of your foot and pull your ankle back until a stretch is felt on your calf area.    Keep your knee in a straightened position during the stretch.    Video # [id number] Repeat 3 Times   Hold 20 Seconds   Complete 1 Set   Perform 2 Times a Day          CALF STRETCH WITH TOWEL - SOLEUS    While in a seated position, place a towel around the ball of your foot and pull your ankle back until a stretch is felt on your calf area.    Keep your knee in a bent position during the stretch.    Video # [id number] Repeat 3 Times   Hold 20 Seconds   Complete 1 Set   Perform 2 Times a Day          ANKLE PUMPS - AP    Bend your foot up and down at your ankle joint as shown.    Video # [id number] Repeat 3 Times   Hold 20 Seconds   Complete 1 Set   Perform  2 Times a Day          Ankle inversion eversion    With your legs out in front of you turn your feet in and then out Repeat 3 Times   Hold 20 Seconds   Perform 2 Times a Day          Runners Calf Stretch    1. Begin in a standing position with both hands against a wall and the legs staggered with the targeted leg behind.  2. Push the toes down into the ground in order to activate the calf muscles.  3. While maintaining this activation, press into the wall and stretch the back of the calf, flexing the ankle as far as possible. Repeat 3 Times   Hold 20 Seconds   Complete 1 Set   Perform 2 Times a Day                    10/18/23  Emailed HEP to [email or web address]     HOME EXERCISE PROGRAM  Created by Christine Mulvihill  Oct 18th, 2023  View videos at [email or web address]    4 Exercises    HAMSTRING STRETCH WITH TOWEL    While lying down on your back, hook a towel or strap under your foot and draw up your leg until a stretch is felt along the backside of your leg.    Keep your knee in a straightened position during the stretch.    Video # [id number] Repeat 3 Times   Hold 30 Seconds   Complete 1 Set   Perform 2 Times a Day          QUADRICEPS STRETCH - SIDE LYING    Lie on your side with your target limb on top. Next, grab your target limb below the knee and pull your knee into a more bent position until a stretch is felt along the front of your thigh. Repeat 3 Times   Hold 30 Seconds   Complete 1 Set   Perform 2 Times a Day          SUPINE HIP EXTERNAL ROTATION STRETCH - MODIFIED NOAH OR FIGURE 4    While lying on your back with your leg crossed over your knee, use your hand and push the crossed knee away from you as shown.    Video # [id number] Repeat 3 Times   Hold 30 Seconds   Complete 1 Set   Perform 2 Times a Day          Piriformis Stretch    Lie on your back with your uninvolved leg bent and the ankle of your involved leg crossed over the top (as shown). Grab the knee of the involved leg with both hands and stretch toward  your chest and then over in the direction of the opposite shoulder. Hold the stretch for 15 seconds, then relax.    Do 5 - 15 second stretches on each leg. Repeat 3 Times   Hold 30 Seconds   Perform 2 Times

## 2023-11-16 NOTE — PROGRESS NOTES
Physical Therapy Visit    PT DAILY NOTE FOR OUTPATIENT THERAPY    Patient: Les Garcia MRN: [id number]  : 1945 78 y.o.  Referring Physician: Eduardo Virk MD  Date of Visit: 11/15/2023    Certification Dates: 10/30/23 through 24    Diagnosis:   1. Contusion of left thigh, initial encounter    2. Right foot drop    3. Difficulty in walking        Chief Complaints:  pain and decreased adl functioning    Precautions:   Existing Precautions/Restrictions: cardiac  Precautions comments: MI, cardiac stent, right foot drop      TODAY'S VISIT    Time In Session:  Start Time: 0900  Stop Time: 1000  Time Calculation (min): 60 min   History/Vitals/Pain/Encounter Info - 11/15/23 0904        Injury History/Precautions/Daily Required Info    Document Type daily treatment     Primary Therapist Christine Mulvihill, PT     Chief Complaint/Reason for Visit  pain and decreased adl functioning     Onset of Illness/Injury or Date of Surgery 23     Referring Physician Eduardo Virk MD     Existing Precautions/Restrictions cardiac     Precautions comments MI, cardiac stent, right foot drop     History of present illness/functional impairment On  tripped on his right drop foot and twisted around landing on his left hip area.  Pt was seen in ED and special tests were performed (-) for fractures. Pt was away at the time on vacation so followed up with ortho upon return and was referred to outpatient PT. 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   He notes that he does have some dull pain lingering however it is overall much improved. adls:  increased pain with walking fast,  aching in leg with adls with associated decreased mobility.   Pts goals:  eliminate left Hamstring pain and improve mobility.  He would also like to not trip anymore so would like his right foot drop addressed at some  point including possible AFOs.     Patient/Family/Caregiver Comments/Observations Saw orthotist and is starting the process for a carbon graphite AFO.  He is also looking into purchasing a fabric one for his sneakers that attaches to the laces. notes at times has soreness and a spams in his left HS.     Patient reported fall since last visit No        Pain Assessment    Currently in pain No/Denies     Pain Rating (0-10): Pre Activity 0     Pain Rating (0-10): Post Activity 0        Pain Intervention    Intervention  TE, MT, TA     Post Intervention Comments spasm decreased in left HS post treatment         Services    Do You Speak a Language Other Than English at Home? no                Daily Treatment Assessment and Plan - 11/15/23 5204        Daily Treatment Assessment and Plan    Progress toward goals Progressing     Daily Outcome Summary Pt appears to be more cognizant of dorsiflexion during swing through phase of gait.  still with significant tightness in his right ankle and knee most likely due to long history of injury.     Plan and Recommendations Continue with POC per primary PT.  Await  prosthetist assessment.                     OBJECTIVE DATA TAKEN TODAY:    None taken    Today's Treatment:    ORTHO PT FLOWSHEET    Exercises Current Session Time y/n   MODALITIES- HEAT/ICE  CPT 62017 TOTAL TIME FOR SESSION Not performed    Heat     Ice/Vasocompression     MODALITIES - E-STIM  CPT 09055   TOTAL TIME FOR SESSION Not performed    E-stim/H-Wave     MODALITIES - US TOTAL TIME FOR SESSION Not performed    US (CPT 82396)     Iontophoresis (CPT 35249)     Mechanical Traction     THER ACT  CPT 85513 TOTAL TIME FOR SESSION 20 minutes    Pain, falls, med changes completed Y   Pt education Pt educated regarding IE results and POC and need for active participation in skilled PT intervention and HEP.  Discussed with pt follow up with MD regarding possible right LE AFO to avoid further tripping and  reinjury.  10/18/23 - reviewed possibility of AFO and pt shown normal hinged AFO, toe off and Spry step brace as options for dorsiflexion assistance in right LE to prevent further tripping/falling injuries.  Reviewed with pt heel toe gait pattern.  Also to discuss possibility of dorsiflexion assist brace vs/with trial of PT to improve mobility in right ankle as it is cause of left leg injury.   10/30/23 - pt education re:  Addition of right ankle to POC.  Instructed in HEP for right ankle.  See TE section.  Pt also educated in the use of his home EMS unit for contraction of anterior tibialis.  Set for 10 sec on/20 seconds off, 50 pps, 350 width.  Pt relayed understanding of set up and need to remove if burning or abnormal pain occurs  11/15/23 - reviewed with pt placement for electrodes for NMES to anterior tibialis on right LE to improve dorsiflexion.  Pt emailed picture of motor point with small electrode on proximal placement/motor  Point and larger pad on distal point as dispersive pad.  Pt appeared to understand.  Also discussed his visit with orthotist and he notes that he will most likely but a fabrid ankle strap with strap to attach to shoe laces to facilitate dorsiflexion and Mani Gabriel the prosthetist is going to start the process to get him a carbon graphite AFO.                           Y   Assessment 10/30/23 - Reevaluation performed to add right foot drop/deviated gait to plan of care.  Results of right ankle evaluation as well as plan discussed with pt as well as need to monitor progress closely due to long nature of involvement of right ankle.  Also discussed improvement in left thigh and meeting with orthotist Mani Gabriel in future for assessment for possible AFO.  Pt agreeable to this and confirmed desire to meet with orthotist.    11/1/23 - performed 2MWT/6MWT and 30 second STS.             n   Body Mechanics/Work Training     THER EX  CPT 40879 TOTAL TIME FOR SESSION 25 minutes    CARDIOVASCULAR   "    Nu Step Level 6-3 x 10 minutes [facility]    Recumbent Bike seat #24  x 10 minutes   [place] loop  Gear 9     Y   Elliptical     Treadmill     HEP 10/30/23 - Pt instructed in a primary HEP for right ankle including ankle dorsiflexion/plantarflexion/inv/ev, calf towel stretch for gastroc and soleus and wall calf stretch.  See chart copy of HEP2Go sheet as issued to pt. n   STRENGTHENING     Supine ther-ex       Bridges       Bilateral knee fall outs       Marching with resistance       Ankle DF ecc control      Prone       HS curls   2 x 10 5 sec  Green band around knees  2 x 10 5 sec  Green band around knees  2 x 10 5 sec  Green band around knees  1 x 10 R foot manual resistance        3 x 10   OTB focus on eccentric control   Y  Y  Y  Y    Y   Seated ther-ex      LAQ      STS      HS curls focus eccentric     10 x arms crossed over chest  10 x green band             Standing ther-ex       Hip flex       Hip abd       Hip ext       Hip add Add resistance next visit with pink band  2 x 10   2 x 10   2 x 10 standing on 4\" block with R hip extension to clear toes   2 x 10          n   STRETCHING     LE stretching       HS strap stretch       Piriformis KTOS       Figure 4 push away       Gastroc stretch   3 x 30 sec B  3 x 30 sec  3 x 30 sec  3 x 30 sec R only   In long sitting with strap - knee flex and knee ext            Other stretching       Incline calf stretch       REPEATED MOVEMENTS     NEURO RE-ED  CPT 16691 TOTAL TIME FOR SESSION Not performed    COORDINATION     POSTURAL RE-ED        PRE-GAIT ACTIVITIES      BALANCE TRAINING      Sitting balance     Standing balance     GAIT TRAINING  CPT 70196 TOTAL TIME FOR SESSION Brief billed in TA    Ambulation  2/6MWT n   Dynamic gait      Stair negotiation     Curb negotiation     Ramp negotiation     Outdoor ambulation     BWS/vector training     MANUAL  CPT 50627 TOTAL TIME FOR SESSION 15 min    Stretching        HS        Hip rotators        Hip " flexors        Calf strap stretch  PROM right ankle focus on        dorsi/eversion   2 x 20 sec  2 x 20 sec  2 x 20 sec  2/30 sec  5/20 sec   Y  Y  Y  Y  Y   Mobilization        TC joint    10   Y   Massage DTM to left Medial HS pt prone _ Y   Taping  prn      10/30/23 -          HOME EXERCISE PROGRAM  Created by Christine Mulvihill  Oct 30th, 2023  View videos at [email or web address]    5 Exercises    CALF STRETCH WITH TOWEL - GASTROCNEMIUS    While in a seated position, place a towel around the ball of your foot and pull your ankle back until a stretch is felt on your calf area.    Keep your knee in a straightened position during the stretch.    Video # [id number] Repeat 3 Times   Hold 20 Seconds   Complete 1 Set   Perform 2 Times a Day          CALF STRETCH WITH TOWEL - SOLEUS    While in a seated position, place a towel around the ball of your foot and pull your ankle back until a stretch is felt on your calf area.    Keep your knee in a bent position during the stretch.    Video # [id number] Repeat 3 Times   Hold 20 Seconds   Complete 1 Set   Perform 2 Times a Day          ANKLE PUMPS - AP    Bend your foot up and down at your ankle joint as shown.    Video # [id number] Repeat 3 Times   Hold 20 Seconds   Complete 1 Set   Perform 2 Times a Day          Ankle inversion eversion    With your legs out in front of you turn your feet in and then out Repeat 3 Times   Hold 20 Seconds   Perform 2 Times a Day          Runners Calf Stretch    1. Begin in a standing position with both hands against a wall and the legs staggered with the targeted leg behind.  2. Push the toes down into the ground in order to activate the calf muscles.  3. While maintaining this activation, press into the wall and stretch the back of the calf, flexing the ankle as far as possible. Repeat 3 Times   Hold 20 Seconds   Complete 1 Set   Perform 2 Times a Day                    10/18/23  Emailed HEP to [email or web address]     HOME EXERCISE PROGRAM  Created  by Christine Mulvihill  Oct 18th, 2023  View videos at [email or web address]    4 Exercises    HAMSTRING STRETCH WITH TOWEL    While lying down on your back, hook a towel or strap under your foot and draw up your leg until a stretch is felt along the backside of your leg.    Keep your knee in a straightened position during the stretch.    Video # [id number] Repeat 3 Times   Hold 30 Seconds   Complete 1 Set   Perform 2 Times a Day          QUADRICEPS STRETCH - SIDE LYING    Lie on your side with your target limb on top. Next, grab your target limb below the knee and pull your knee into a more bent position until a stretch is felt along the front of your thigh. Repeat 3 Times   Hold 30 Seconds   Complete 1 Set   Perform 2 Times a Day          SUPINE HIP EXTERNAL ROTATION STRETCH - MODIFIED NOAH OR FIGURE 4    While lying on your back with your leg crossed over your knee, use your hand and push the crossed knee away from you as shown.    Video # [id number] Repeat 3 Times   Hold 30 Seconds   Complete 1 Set   Perform 2 Times a Day          Piriformis Stretch    Lie on your back with your uninvolved leg bent and the ankle of your involved leg crossed over the top (as shown). Grab the knee of the involved leg with both hands and stretch toward your chest and then over in the direction of the opposite shoulder. Hold the stretch for 15 seconds, then relax.    Do 5 - 15 second stretches on each leg. Repeat 3 Times   Hold 30 Seconds   Perform 2 Times

## 2023-11-17 ENCOUNTER — HOSPITAL ENCOUNTER (OUTPATIENT)
Dept: PHYSICAL THERAPY | Facility: REHABILITATION | Age: 78
Setting detail: THERAPIES SERIES
Discharge: HOME | End: 2023-11-17
Attending: FAMILY MEDICINE
Payer: MEDICARE

## 2023-11-17 DIAGNOSIS — R26.2 DIFFICULTY IN WALKING: ICD-10-CM

## 2023-11-17 DIAGNOSIS — M21.371 RIGHT FOOT DROP: ICD-10-CM

## 2023-11-17 DIAGNOSIS — S70.12XA CONTUSION OF LEFT THIGH, INITIAL ENCOUNTER: Primary | ICD-10-CM

## 2023-11-17 PROCEDURE — 97140 MANUAL THERAPY 1/> REGIONS: CPT | Mod: GP,CQ

## 2023-11-17 PROCEDURE — 97110 THERAPEUTIC EXERCISES: CPT | Mod: GP,CQ

## 2023-11-17 NOTE — PROGRESS NOTES
Physical Therapy Visit    PT DAILY NOTE FOR OUTPATIENT THERAPY    Patient: Les Garcia MRN: [id number]  : 1945 78 y.o.  Referring Physician: Eduardo Virk MD  Date of Visit: 2023    Certification Dates: 10/30/23 through 24    Diagnosis:   1. Contusion of left thigh, initial encounter    2. Right foot drop    3. Difficulty in walking        Chief Complaints:  pain and decreased adl functioning    Precautions:   Existing Precautions/Restrictions: cardiac  Precautions comments: MI, cardiac stent, right foot drop      TODAY'S VISIT    Time In Session:  Start Time: 802  Stop Time: 857  Time Calculation (min): 55 min   History/Vitals/Pain/Encounter Info - 23 0758        Injury History/Precautions/Daily Required Info    Document Type daily treatment     Primary Therapist Christine Mulvihill, PT     Chief Complaint/Reason for Visit  pain and decreased adl functioning     Onset of Illness/Injury or Date of Surgery 23     Referring Physician Eduardo Virk MD     Existing Precautions/Restrictions cardiac     Precautions comments MI, cardiac stent, right foot drop     History of present illness/functional impairment On  tripped on his right drop foot and twisted around landing on his left hip area.  Pt was seen in ED and special tests were performed (-) for fractures. Pt was away at the time on vacation so followed up with ortho upon return and was referred to outpatient PT. 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   He notes that he does have some dull pain lingering however it is overall much improved. adls:  increased pain with walking fast,  aching in leg with adls with associated decreased mobility.   Pts goals:  eliminate left Hamstring pain and improve mobility.  He would also like to not trip anymore so would like his right foot drop addressed at some  point including possible AFOs.     Patient/Family/Caregiver Comments/Observations Les arrived with no pain. He states that he tried to use the NMES but could not get a good contraction. Reiterated the areas to place the pads, but reminded patient that he may not get a big movement with the NMES given how long ago he sustained his injuries.     Patient reported fall since last visit No        Pain Assessment    Currently in pain No/Denies        Pain Intervention    Intervention  manual therapy, exercise     Post Intervention Comments no pain         Services    Do You Speak a Language Other Than English at Home? no                Daily Treatment Assessment and Plan - 11/17/23 0758        Daily Treatment Assessment and Plan    Progress toward goals Progressing     Daily Outcome Summary Les tolerated session well. He should be able to advance to blue band for resistance exercises next visit.     Plan and Recommendations Continue with POC per primary PT.  Awaiting carbon fiber AFO                       Today's Treatment:    ORTHO PT FLOWSHEET    Exercises Current Session Time y/n   MODALITIES- HEAT/ICE  CPT 30874 TOTAL TIME FOR SESSION Not performed    Heat     Ice/Vasocompression     MODALITIES - E-STIM  CPT 89289   TOTAL TIME FOR SESSION Not performed    E-stim/H-Wave     MODALITIES - US TOTAL TIME FOR SESSION Not performed    US (CPT 61582)     Iontophoresis (CPT 91655)     Mechanical Traction     THER ACT  CPT 85134 TOTAL TIME FOR SESSION Brief    Pain, falls, med changes completed y   Pt education Pt educated regarding IE results and POC and need for active participation in skilled PT intervention and HEP.  Discussed with pt follow up with MD regarding possible right LE AFO to avoid further tripping and reinjury.  10/18/23 - reviewed possibility of AFO and pt shown normal hinged AFO, toe off and Spry step brace as options for dorsiflexion assistance in right LE to prevent further tripping/falling  injuries.  Reviewed with pt heel toe gait pattern.  Also to discuss possibility of dorsiflexion assist brace vs/with trial of PT to improve mobility in right ankle as it is cause of left leg injury.   10/30/23 - pt education re:  Addition of right ankle to POC.  Instructed in HEP for right ankle.  See TE section.  Pt also educated in the use of his home EMS unit for contraction of anterior tibialis.  Set for 10 sec on/20 seconds off, 50 pps, 350 width.  Pt relayed understanding of set up and need to remove if burning or abnormal pain occurs  11/15/23 - reviewed with pt placement for electrodes for NMES to anterior tibialis on right LE to improve dorsiflexion.  Pt emailed picture of motor point with small electrode on proximal placement/motor  Point and larger pad on distal point as dispersive pad.  Pt appeared to understand.  Also discussed his visit with orthotist and he notes that he will most likely but a fabrid ankle strap with strap to attach to shoe laces to facilitate dorsiflexion and Mani Gabriel the prosthetist is going to start the process to get him a carbon graphite AFO.                           y   Assessment 10/30/23 - Reevaluation performed to add right foot drop/deviated gait to plan of care.  Results of right ankle evaluation as well as plan discussed with pt as well as need to monitor progress closely due to long nature of involvement of right ankle.  Also discussed improvement in left thigh and meeting with orthotist Mani Gabriel in future for assessment for possible AFO.  Pt agreeable to this and confirmed desire to meet with orthotist.    11/1/23 - performed 2MWT/6MWT and 30 second STS.             n   Body Mechanics/Work Training     THER EX  CPT 82948 TOTAL TIME FOR SESSION 45 minutes      CARDIOVASCULAR      Nu Step Level 6-3 x 10 minutes [facility]    Recumbent Bike seat #24  x 10 minutes   [place]  Gear 9     y   Elliptical     Treadmill     HEP 10/30/23 - Pt instructed in a  "primary HEP for right ankle including ankle dorsiflexion/plantarflexion/inv/ev, calf towel stretch for gastroc and soleus and wall calf stretch.  See chart copy of HEP2Go sheet as issued to pt.    STRENGTHENING     Supine ther-ex       Bridges       Bilateral knee fall outs       Marching with resistance       Ankle DF ecc control      Prone       HS curls   2 x 10 5 sec  Green band    Blue band next visit  2 x 10 5 sec  Green band    Blue band next visit  2 x 10 5 sec  Green band    Blue band next visit  1 x 10 R foot manual resistance        3 x 10   OTB focus on eccentric control   y  y  y  y       Seated ther-ex      LAQ      STS      HS curls focus eccentric     10 x arms crossed over chest  10 x green band  Blue band next visit       y  y   Standing ther-ex       Hip flex       Hip abd       Hip ext       Hip add Add resistance next visit with pink band  2 x 10   2 x 10   2 x 10 standing on 4\" block with R hip extension to clear toes   2 x 10          n   STRETCHING     LE stretching       HS strap stretch       Piriformis KTOS       Figure 4 push away       Gastroc stretch   3 x 30 sec B  3 x 30 sec B  3 x 30 sec B  3 x 30 sec R only   In long sitting with strap - knee flex and knee ext   y  y  y  y   Other stretching       Incline calf stretch       REPEATED MOVEMENTS     NEURO RE-ED  CPT 23373 TOTAL TIME FOR SESSION Not performed    COORDINATION     POSTURAL RE-ED        PRE-GAIT ACTIVITIES      BALANCE TRAINING      Sitting balance     Standing balance     GAIT TRAINING  CPT 93752 TOTAL TIME FOR SESSION Brief billed in TA    Ambulation  2/6MWT n   Dynamic gait      Stair negotiation     Curb negotiation     Ramp negotiation     Outdoor ambulation     BWS/vector training     MANUAL  CPT 50678 TOTAL TIME FOR SESSION 10 minutes    Stretching        HS        Hip rotators        Hip flexors        Calf strap stretch        PROM R ankle DF/EV   2 x 20 sec  2 x 20 sec  2 x 20 sec  2 x 30 sec  5 x 20 sec "   n  n  n  n  y   Mobilization        TC joint    10   n   Massage DTM to left Medial HS pt prone _ n   Taping  prn      10/30/23 -          HOME EXERCISE PROGRAM  Created by Christine Mulvihill  Oct 30th, 2023  View videos at [email or web address]    5 Exercises    CALF STRETCH WITH TOWEL - GASTROCNEMIUS    While in a seated position, place a towel around the ball of your foot and pull your ankle back until a stretch is felt on your calf area.    Keep your knee in a straightened position during the stretch.    Video # [id number] Repeat 3 Times   Hold 20 Seconds   Complete 1 Set   Perform 2 Times a Day          CALF STRETCH WITH TOWEL - SOLEUS    While in a seated position, place a towel around the ball of your foot and pull your ankle back until a stretch is felt on your calf area.    Keep your knee in a bent position during the stretch.    Video # [id number] Repeat 3 Times   Hold 20 Seconds   Complete 1 Set   Perform 2 Times a Day          ANKLE PUMPS - AP    Bend your foot up and down at your ankle joint as shown.    Video # [id number] Repeat 3 Times   Hold 20 Seconds   Complete 1 Set   Perform 2 Times a Day          Ankle inversion eversion    With your legs out in front of you turn your feet in and then out Repeat 3 Times   Hold 20 Seconds   Perform 2 Times a Day          Runners Calf Stretch    1. Begin in a standing position with both hands against a wall and the legs staggered with the targeted leg behind.  2. Push the toes down into the ground in order to activate the calf muscles.  3. While maintaining this activation, press into the wall and stretch the back of the calf, flexing the ankle as far as possible. Repeat 3 Times   Hold 20 Seconds   Complete 1 Set   Perform 2 Times a Day                    10/18/23  Emailed HEP to [email or web address]     HOME EXERCISE PROGRAM  Created by Christine Mulvihill  Oct 18th, 2023  View videos at [email or web address]    4 Exercises    HAMSTRING STRETCH WITH TOWEL    While lying down on your  back, hook a towel or strap under your foot and draw up your leg until a stretch is felt along the backside of your leg.    Keep your knee in a straightened position during the stretch.    Video # [id number] Repeat 3 Times   Hold 30 Seconds   Complete 1 Set   Perform 2 Times a Day          QUADRICEPS STRETCH - SIDE LYING    Lie on your side with your target limb on top. Next, grab your target limb below the knee and pull your knee into a more bent position until a stretch is felt along the front of your thigh. Repeat 3 Times   Hold 30 Seconds   Complete 1 Set   Perform 2 Times a Day          SUPINE HIP EXTERNAL ROTATION STRETCH - MODIFIED NOAH OR FIGURE 4    While lying on your back with your leg crossed over your knee, use your hand and push the crossed knee away from you as shown.    Video # [id number] Repeat 3 Times   Hold 30 Seconds   Complete 1 Set   Perform 2 Times a Day          Piriformis Stretch    Lie on your back with your uninvolved leg bent and the ankle of your involved leg crossed over the top (as shown). Grab the knee of the involved leg with both hands and stretch toward your chest and then over in the direction of the opposite shoulder. Hold the stretch for 15 seconds, then relax.    Do 5 - 15 second stretches on each leg. Repeat 3 Times   Hold 30 Seconds   Perform 2 Times

## 2023-11-17 NOTE — OP PT TREATMENT LOG
ORTHO PT FLOWSHEET    Exercises Current Session Time y/n   MODALITIES- HEAT/ICE  CPT 04914 TOTAL TIME FOR SESSION Not performed    Heat     Ice/Vasocompression     MODALITIES - E-STIM  CPT 92269   TOTAL TIME FOR SESSION Not performed    E-stim/H-Wave     MODALITIES - US TOTAL TIME FOR SESSION Not performed    US (CPT 46836)     Iontophoresis (CPT 92517)     Mechanical Traction     THER ACT  CPT 04195 TOTAL TIME FOR SESSION Brief    Pain, falls, med changes completed y   Pt education Pt educated regarding IE results and POC and need for active participation in skilled PT intervention and HEP.  Discussed with pt follow up with MD regarding possible right LE AFO to avoid further tripping and reinjury.  10/18/23 - reviewed possibility of AFO and pt shown normal hinged AFO, toe off and Spry step brace as options for dorsiflexion assistance in right LE to prevent further tripping/falling injuries.  Reviewed with pt heel toe gait pattern.  Also to discuss possibility of dorsiflexion assist brace vs/with trial of PT to improve mobility in right ankle as it is cause of left leg injury.   10/30/23 - pt education re:  Addition of right ankle to POC.  Instructed in HEP for right ankle.  See TE section.  Pt also educated in the use of his home EMS unit for contraction of anterior tibialis.  Set for 10 sec on/20 seconds off, 50 pps, 350 width.  Pt relayed understanding of set up and need to remove if burning or abnormal pain occurs  11/15/23 - reviewed with pt placement for electrodes for NMES to anterior tibialis on right LE to improve dorsiflexion.  Pt emailed picture of motor point with small electrode on proximal placement/motor  Point and larger pad on distal point as dispersive pad.  Pt appeared to understand.  Also discussed his visit with orthotist and he notes that he will most likely but a fabrid ankle strap with strap to attach to shoe laces to facilitate dorsiflexion and Mani Gabriel the prosthetist is going to start  "the process to get him a carbon graphite AFO.                           y   Assessment 10/30/23 - Reevaluation performed to add right foot drop/deviated gait to plan of care.  Results of right ankle evaluation as well as plan discussed with pt as well as need to monitor progress closely due to long nature of involvement of right ankle.  Also discussed improvement in left thigh and meeting with orthotist Mani Gabriel in future for assessment for possible AFO.  Pt agreeable to this and confirmed desire to meet with orthotist.    11/1/23 - performed 2MWT/6MWT and 30 second STS.             n   Body Mechanics/Work Training     THER EX  CPT 38553 TOTAL TIME FOR SESSION 45 minutes      CARDIOVASCULAR      Nu Step Level 6-3 x 10 minutes [facility]    Recumbent Bike seat #24  x 10 minutes   Evening [place]  Gear 9     y   Elliptical     Treadmill     HEP 10/30/23 - Pt instructed in a primary HEP for right ankle including ankle dorsiflexion/plantarflexion/inv/ev, calf towel stretch for gastroc and soleus and wall calf stretch.  See chart copy of HEP2Go sheet as issued to pt.    STRENGTHENING     Supine ther-ex       Bridges       Bilateral knee fall outs       Marching with resistance       Ankle DF ecc control      Prone       HS curls   2 x 10 5 sec  Green band    Blue band next visit  2 x 10 5 sec  Green band    Blue band next visit  2 x 10 5 sec  Green band    Blue band next visit  1 x 10 R foot manual resistance        3 x 10   OTB focus on eccentric control   y  y  y  y       Seated ther-ex      LAQ      STS      HS curls focus eccentric     10 x arms crossed over chest  10 x green band  Blue band next visit       y  y   Standing ther-ex       Hip flex       Hip abd       Hip ext       Hip add Add resistance next visit with pink band  2 x 10   2 x 10   2 x 10 standing on 4\" block with R hip extension to clear toes   2 x 10          n   STRETCHING     LE stretching       HS strap stretch       Piriformis KTOS    "    Figure 4 push away       Gastroc stretch   3 x 30 sec B  3 x 30 sec B  3 x 30 sec B  3 x 30 sec R only   In long sitting with strap - knee flex and knee ext   y  y  y  y   Other stretching       Incline calf stretch       REPEATED MOVEMENTS     NEURO RE-ED  CPT 54368 TOTAL TIME FOR SESSION Not performed    COORDINATION     POSTURAL RE-ED        PRE-GAIT ACTIVITIES      BALANCE TRAINING      Sitting balance     Standing balance     GAIT TRAINING  CPT 79541 TOTAL TIME FOR SESSION Brief billed in TA    Ambulation  2/6MWT n   Dynamic gait      Stair negotiation     Curb negotiation     Ramp negotiation     Outdoor ambulation     BWS/vector training     MANUAL  CPT 79899 TOTAL TIME FOR SESSION 10 minutes    Stretching        HS        Hip rotators        Hip flexors        Calf strap stretch        PROM R ankle DF/EV   2 x 20 sec  2 x 20 sec  2 x 20 sec  2 x 30 sec  5 x 20 sec   n  n  n  n  y   Mobilization        TC joint    10   n   Massage DTM to left Medial HS pt prone _ n   Taping  prn      10/30/23 -          HOME EXERCISE PROGRAM  Created by Christine Mulvihill  Oct 30th, 2023  View videos at [email or web address]    5 Exercises    CALF STRETCH WITH TOWEL - GASTROCNEMIUS    While in a seated position, place a towel around the ball of your foot and pull your ankle back until a stretch is felt on your calf area.    Keep your knee in a straightened position during the stretch.    Video # [id number] Repeat 3 Times   Hold 20 Seconds   Complete 1 Set   Perform 2 Times a Day          CALF STRETCH WITH TOWEL - SOLEUS    While in a seated position, place a towel around the ball of your foot and pull your ankle back until a stretch is felt on your calf area.    Keep your knee in a bent position during the stretch.    Video # [id number] Repeat 3 Times   Hold 20 Seconds   Complete 1 Set   Perform 2 Times a Day          ANKLE PUMPS - AP    Bend your foot up and down at your ankle joint as shown.    Video # [id number] Repeat 3 Times    Hold 20 Seconds   Complete 1 Set   Perform 2 Times a Day          Ankle inversion eversion    With your legs out in front of you turn your feet in and then out Repeat 3 Times   Hold 20 Seconds   Perform 2 Times a Day          Runners Calf Stretch    1. Begin in a standing position with both hands against a wall and the legs staggered with the targeted leg behind.  2. Push the toes down into the ground in order to activate the calf muscles.  3. While maintaining this activation, press into the wall and stretch the back of the calf, flexing the ankle as far as possible. Repeat 3 Times   Hold 20 Seconds   Complete 1 Set   Perform 2 Times a Day                    10/18/23  Emailed HEP to [email or web address]     HOME EXERCISE PROGRAM  Created by Christine Mulvihill  Oct 18th, 2023  View videos at [email or web address]    4 Exercises    HAMSTRING STRETCH WITH TOWEL    While lying down on your back, hook a towel or strap under your foot and draw up your leg until a stretch is felt along the backside of your leg.    Keep your knee in a straightened position during the stretch.    Video # [id number] Repeat 3 Times   Hold 30 Seconds   Complete 1 Set   Perform 2 Times a Day          QUADRICEPS STRETCH - SIDE LYING    Lie on your side with your target limb on top. Next, grab your target limb below the knee and pull your knee into a more bent position until a stretch is felt along the front of your thigh. Repeat 3 Times   Hold 30 Seconds   Complete 1 Set   Perform 2 Times a Day          SUPINE HIP EXTERNAL ROTATION STRETCH - MODIFIED NOAH OR FIGURE 4    While lying on your back with your leg crossed over your knee, use your hand and push the crossed knee away from you as shown.    Video # [id number] Repeat 3 Times   Hold 30 Seconds   Complete 1 Set   Perform 2 Times a Day          Piriformis Stretch    Lie on your back with your uninvolved leg bent and the ankle of your involved leg crossed over the top (as shown). Grab the knee of the  involved leg with both hands and stretch toward your chest and then over in the direction of the opposite shoulder. Hold the stretch for 15 seconds, then relax.    Do 5 - 15 second stretches on each leg. Repeat 3 Times   Hold 30 Seconds   Perform 2 Times

## 2023-11-24 ENCOUNTER — HOSPITAL ENCOUNTER (OUTPATIENT)
Dept: PHYSICAL THERAPY | Facility: REHABILITATION | Age: 78
Setting detail: THERAPIES SERIES
Discharge: HOME | End: 2023-11-24
Attending: FAMILY MEDICINE
Payer: MEDICARE

## 2023-11-24 DIAGNOSIS — S70.12XA CONTUSION OF LEFT THIGH, INITIAL ENCOUNTER: Primary | ICD-10-CM

## 2023-11-24 DIAGNOSIS — R26.2 DIFFICULTY IN WALKING: ICD-10-CM

## 2023-11-24 DIAGNOSIS — M21.371 RIGHT FOOT DROP: ICD-10-CM

## 2023-11-24 PROCEDURE — 97140 MANUAL THERAPY 1/> REGIONS: CPT | Mod: GP

## 2023-11-24 PROCEDURE — 97110 THERAPEUTIC EXERCISES: CPT | Mod: GP

## 2023-11-24 NOTE — OP PT TREATMENT LOG
ORTHO PT FLOWSHEET    Exercises Current Session Time y/n   MODALITIES- HEAT/ICE  CPT 83733 TOTAL TIME FOR SESSION Not performed    Heat     Ice/Vasocompression     MODALITIES - E-STIM  CPT 97602   TOTAL TIME FOR SESSION Not performed    E-stim/H-Wave     MODALITIES - US TOTAL TIME FOR SESSION Not performed    US (CPT 90636)     Iontophoresis (CPT 93650)     Mechanical Traction     THER ACT  CPT 21482 TOTAL TIME FOR SESSION Brief    Pain, falls, med changes completed y   Pt education Pt educated regarding IE results and POC and need for active participation in skilled PT intervention and HEP.  Discussed with pt follow up with MD regarding possible right LE AFO to avoid further tripping and reinjury.  10/18/23 - reviewed possibility of AFO and pt shown normal hinged AFO, toe off and Spry step brace as options for dorsiflexion assistance in right LE to prevent further tripping/falling injuries.  Reviewed with pt heel toe gait pattern.  Also to discuss possibility of dorsiflexion assist brace vs/with trial of PT to improve mobility in right ankle as it is cause of left leg injury.   10/30/23 - pt education re:  Addition of right ankle to POC.  Instructed in HEP for right ankle.  See TE section.  Pt also educated in the use of his home EMS unit for contraction of anterior tibialis.  Set for 10 sec on/20 seconds off, 50 pps, 350 width.  Pt relayed understanding of set up and need to remove if burning or abnormal pain occurs  11/15/23 - reviewed with pt placement for electrodes for NMES to anterior tibialis on right LE to improve dorsiflexion.  Pt emailed picture of motor point with small electrode on proximal placement/motor  Point and larger pad on distal point as dispersive pad.  Pt appeared to understand.  Also discussed his visit with orthotist and he notes that he will most likely but a fabrid ankle strap with strap to attach to shoe laces to facilitate dorsiflexion and Mani Gabriel the prosthetist is going to start  "the process to get him a carbon graphite AFO.                           y   Assessment 10/30/23 - Reevaluation performed to add right foot drop/deviated gait to plan of care.  Results of right ankle evaluation as well as plan discussed with pt as well as need to monitor progress closely due to long nature of involvement of right ankle.  Also discussed improvement in left thigh and meeting with orthotist Mani Gabriel in future for assessment for possible AFO.  Pt agreeable to this and confirmed desire to meet with orthotist.    11/1/23 - performed 2MWT/6MWT and 30 second STS.             n   Body Mechanics/Work Training     THER EX  CPT 96607 TOTAL TIME FOR SESSION 45 minutes      CARDIOVASCULAR      Nu Step Level 6-3 x 10 minutes [facility]    Recumbent Bike seat #24  x 10 minutes   Evening [place]  Gear 9     Y   Elliptical     Treadmill     HEP 10/30/23 - Pt instructed in a primary HEP for right ankle including ankle dorsiflexion/plantarflexion/inv/ev, calf towel stretch for gastroc and soleus and wall calf stretch.  See chart copy of HEP2Go sheet as issued to pt.    STRENGTHENING     Supine ther-ex       Bridges       Bilateral knee fall outs       Marching with resistance       Ankle DF ecc control      Prone       HS curls   2 x 10 5 sec  Green band    Blue band next visit  2 x 10 5 sec  Green band    Blue band next visit  2 x 10 5 sec  Green band    Blue band next visit  1 x 10 R foot manual resistance        3 x 10   OTB focus on eccentric control              Seated ther-ex      LAQ      STS      HS curls focus eccentric     10 x arms crossed over chest  10 x green band  Blue band next visit            Standing ther-ex       Hip flex       Hip abd       Hip ext       Hip add      Heel raises      SLS Add resistance next visit with pink band  2 x 10   2 x 10   2 x 10 standing on 4\" block with R hip extension to clear toes   2 x 10   2/10  3/30 sec   Y  Y  Y  Y  Y  Y   STRETCHING     LE stretching      "  HS strap stretch       Piriformis KTOS       Figure 4 push away       Gastroc stretch       Calf stretch off step   3 x 30 sec B  3 x 30 sec B  3 x 30 sec B  3 x 30 sec R only   In long sitting with strap - knee flex and knee ext     y  y  y  y   Other stretching       Incline calf stretch       REPEATED MOVEMENTS     NEURO RE-ED  CPT 32284 TOTAL TIME FOR SESSION Not performed    COORDINATION     POSTURAL RE-ED        PRE-GAIT ACTIVITIES      BALANCE TRAINING      Sitting balance     Standing balance     GAIT TRAINING  CPT 65275 TOTAL TIME FOR SESSION Brief billed in TA    Ambulation  2/6MWT n   Dynamic gait      Stair negotiation     Curb negotiation     Ramp negotiation     Outdoor ambulation     BWS/vector training     MANUAL  CPT 63675 TOTAL TIME FOR SESSION 10 minutes    Stretching        HS        Hip rotators        Hip flexors        Calf strap stretch        PROM R ankle DF/EV   2 x 20 sec  2 x 20 sec  2 x 20 sec  2 x 30 sec  5 x 20 sec     n  n  n  Y   Mobilization        TC joint    10   Y   Massage DTM to left Medial HS pt prone _ n   Taping  prn      10/30/23 -          HOME EXERCISE PROGRAM  Created by Christine Mulvihill  Oct 30th, 2023  View videos at [email or web address]    5 Exercises    CALF STRETCH WITH TOWEL - GASTROCNEMIUS    While in a seated position, place a towel around the ball of your foot and pull your ankle back until a stretch is felt on your calf area.    Keep your knee in a straightened position during the stretch.    Video # [id number] Repeat 3 Times   Hold 20 Seconds   Complete 1 Set   Perform 2 Times a Day          CALF STRETCH WITH TOWEL - SOLEUS    While in a seated position, place a towel around the ball of your foot and pull your ankle back until a stretch is felt on your calf area.    Keep your knee in a bent position during the stretch.    Video # [id number] Repeat 3 Times   Hold 20 Seconds   Complete 1 Set   Perform 2 Times a Day          ANKLE PUMPS - AP    Bend your foot up and  down at your ankle joint as shown.    Video # [id number] Repeat 3 Times   Hold 20 Seconds   Complete 1 Set   Perform 2 Times a Day          Ankle inversion eversion    With your legs out in front of you turn your feet in and then out Repeat 3 Times   Hold 20 Seconds   Perform 2 Times a Day          Runners Calf Stretch    1. Begin in a standing position with both hands against a wall and the legs staggered with the targeted leg behind.  2. Push the toes down into the ground in order to activate the calf muscles.  3. While maintaining this activation, press into the wall and stretch the back of the calf, flexing the ankle as far as possible. Repeat 3 Times   Hold 20 Seconds   Complete 1 Set   Perform 2 Times a Day                    10/18/23  Emailed HEP to [email or web address]     HOME EXERCISE PROGRAM  Created by Christine Mulvihill  Oct 18th, 2023  View videos at [email or web address]    4 Exercises    HAMSTRING STRETCH WITH TOWEL    While lying down on your back, hook a towel or strap under your foot and draw up your leg until a stretch is felt along the backside of your leg.    Keep your knee in a straightened position during the stretch.    Video # [id number] Repeat 3 Times   Hold 30 Seconds   Complete 1 Set   Perform 2 Times a Day          QUADRICEPS STRETCH - SIDE LYING    Lie on your side with your target limb on top. Next, grab your target limb below the knee and pull your knee into a more bent position until a stretch is felt along the front of your thigh. Repeat 3 Times   Hold 30 Seconds   Complete 1 Set   Perform 2 Times a Day          SUPINE HIP EXTERNAL ROTATION STRETCH - MODIFIED NOAH OR FIGURE 4    While lying on your back with your leg crossed over your knee, use your hand and push the crossed knee away from you as shown.    Video # [id number] Repeat 3 Times   Hold 30 Seconds   Complete 1 Set   Perform 2 Times a Day          Piriformis Stretch    Lie on your back with your uninvolved leg bent and the ankle of  your involved leg crossed over the top (as shown). Grab the knee of the involved leg with both hands and stretch toward your chest and then over in the direction of the opposite shoulder. Hold the stretch for 15 seconds, then relax.    Do 5 - 15 second stretches on each leg. Repeat 3 Times   Hold 30 Seconds   Perform 2 Times

## 2023-11-25 NOTE — PROGRESS NOTES
Physical Therapy Visit    PT DAILY NOTE FOR OUTPATIENT THERAPY    Patient: Les Garcia MRN: [id number]  : 1945 78 y.o.  Referring Physician: Eduardo Vikr MD  Date of Visit: 2023    Certification Dates: 10/30/23 through 24    Diagnosis:   1. Contusion of left thigh, initial encounter    2. Right foot drop    3. Difficulty in walking        Chief Complaints:  pain and decreased adl functioning    Precautions:   Existing Precautions/Restrictions: cardiac  Precautions comments: MI, cardiac stent, right foot drop      TODAY'S VISIT    Time In Session:  Start Time: 1000  Stop Time: 1100  Time Calculation (min): 60 min   History/Vitals/Pain/Encounter Info - 23 1006        Injury History/Precautions/Daily Required Info    Document Type daily treatment     Primary Therapist Christine Mulvihill, PT     Chief Complaint/Reason for Visit  pain and decreased adl functioning     Onset of Illness/Injury or Date of Surgery 23     Referring Physician Eduardo Virk MD     Existing Precautions/Restrictions cardiac     Precautions comments MI, cardiac stent, right foot drop     History of present illness/functional impairment On  tripped on his right drop foot and twisted around landing on his left hip area.  Pt was seen in ED and special tests were performed (-) for fractures. Pt was away at the time on vacation so followed up with ortho upon return and was referred to outpatient PT. 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   He notes that he does have some dull pain lingering however it is overall much improved. adls:  increased pain with walking fast,  aching in leg with adls with associated decreased mobility.   Pts goals:  eliminate left Hamstring pain and improve mobility.  He would also like to not trip anymore so would like his right foot drop addressed at some  "point including possible AFOs.     Patient/Family/Caregiver Comments/Observations Pt notes that he is \"doing fine\" with his treatment.  Is still looking for right ankle brace that attaches to his shoe laces.  As far as his HS goes, \"the hamstring is as good as it is going to get\".  Only with occasional pain in HS and the level is low and doesn't affect his adl functioning.     Patient reported fall since last visit No        Pain Assessment    Currently in pain Yes     Preferred Pain Scale number (Numeric Rating Pain Scale)     Pain: Body location Leg     Pain Rating (0-10): Pre Activity 0     Pain Rating (0-10): Post Activity 0        Pain Intervention    Intervention  MT, TE     Post Intervention Comments improved ROM         Services    Do You Speak a Language Other Than English at Home? no                Daily Treatment Assessment and Plan - 11/24/23 1800        Daily Treatment Assessment and Plan    Progress toward goals Progressing     Daily Outcome Summary Les continues to show improvement in HS flexibility and pain is only intermittent now.  He is in the process of getting an AFO which will help with his gait by controlling his right foot drop.     Plan and Recommendations Continue with POC per primary PT.  Awaiting carbon fiber AFO                     OBJECTIVE DATA TAKEN TODAY:    None taken    Today's Treatment:    ORTHO PT FLOWSHEET    Exercises Current Session Time y/n   MODALITIES- HEAT/ICE  CPT 66332 TOTAL TIME FOR SESSION Not performed    Heat     Ice/Vasocompression     MODALITIES - E-STIM  CPT 14270   TOTAL TIME FOR SESSION Not performed    E-stim/H-Wave     MODALITIES - US TOTAL TIME FOR SESSION Not performed    US (CPT 17377)     Iontophoresis (CPT 53182)     Mechanical Traction     THER ACT  CPT 66875 TOTAL TIME FOR SESSION Brief    Pain, falls, med changes completed y   Pt education Pt educated regarding IE results and POC and need for active participation in skilled PT intervention " and HEP.  Discussed with pt follow up with MD regarding possible right LE AFO to avoid further tripping and reinjury.  10/18/23 - reviewed possibility of AFO and pt shown normal hinged AFO, toe off and Spry step brace as options for dorsiflexion assistance in right LE to prevent further tripping/falling injuries.  Reviewed with pt heel toe gait pattern.  Also to discuss possibility of dorsiflexion assist brace vs/with trial of PT to improve mobility in right ankle as it is cause of left leg injury.   10/30/23 - pt education re:  Addition of right ankle to POC.  Instructed in HEP for right ankle.  See TE section.  Pt also educated in the use of his home EMS unit for contraction of anterior tibialis.  Set for 10 sec on/20 seconds off, 50 pps, 350 width.  Pt relayed understanding of set up and need to remove if burning or abnormal pain occurs  11/15/23 - reviewed with pt placement for electrodes for NMES to anterior tibialis on right LE to improve dorsiflexion.  Pt emailed picture of motor point with small electrode on proximal placement/motor  Point and larger pad on distal point as dispersive pad.  Pt appeared to understand.  Also discussed his visit with orthotist and he notes that he will most likely but a fabrid ankle strap with strap to attach to shoe laces to facilitate dorsiflexion and Mani Gabriel the prosthetist is going to start the process to get him a carbon graphite AFO.                           y   Assessment 10/30/23 - Reevaluation performed to add right foot drop/deviated gait to plan of care.  Results of right ankle evaluation as well as plan discussed with pt as well as need to monitor progress closely due to long nature of involvement of right ankle.  Also discussed improvement in left thigh and meeting with orthotist Mani Gabriel in future for assessment for possible AFO.  Pt agreeable to this and confirmed desire to meet with orthotist.    11/1/23 - performed 2MWT/6MWT and 30 second STS.  "            n   Body Mechanics/Work Training     THER EX  CPT 16536 TOTAL TIME FOR SESSION 45 minutes      CARDIOVASCULAR      Nu Step Level 6-3 x 10 minutes [facility]    Recumbent Bike seat #24  x 10 minutes   [place]  Gear 9     Y   Elliptical     Treadmill     HEP 10/30/23 - Pt instructed in a primary HEP for right ankle including ankle dorsiflexion/plantarflexion/inv/ev, calf towel stretch for gastroc and soleus and wall calf stretch.  See chart copy of HEP2Go sheet as issued to pt.    STRENGTHENING     Supine ther-ex       Bridges       Bilateral knee fall outs       Marching with resistance       Ankle DF ecc control      Prone       HS curls   2 x 10 5 sec  Green band    Blue band next visit  2 x 10 5 sec  Green band    Blue band next visit  2 x 10 5 sec  Green band    Blue band next visit  1 x 10 R foot manual resistance        3 x 10   OTB focus on eccentric control              Seated ther-ex      LAQ      STS      HS curls focus eccentric     10 x arms crossed over chest  10 x green band  Blue band next visit            Standing ther-ex       Hip flex       Hip abd       Hip ext       Hip add      Heel raises      SLS Add resistance next visit with pink band  2 x 10   2 x 10   2 x 10 standing on 4\" block with R hip extension to clear toes   2 x 10   2/10  3/30 sec   Y  Y  Y  Y  Y  Y   STRETCHING     LE stretching       HS strap stretch       Piriformis KTOS       Figure 4 push away       Gastroc stretch       Calf stretch off step   3 x 30 sec B  3 x 30 sec B  3 x 30 sec B  3 x 30 sec R only   In long sitting with strap - knee flex and knee ext     y  y  y  y   Other stretching       Incline calf stretch       REPEATED MOVEMENTS     NEURO RE-ED  CPT 78370 TOTAL TIME FOR SESSION Not performed    COORDINATION     POSTURAL RE-ED        PRE-GAIT ACTIVITIES      BALANCE TRAINING      Sitting balance     Standing balance     GAIT TRAINING  CPT 54840 TOTAL TIME FOR SESSION Brief billed in TA  "   Ambulation  2/6MWT n   Dynamic gait      Stair negotiation     Curb negotiation     Ramp negotiation     Outdoor ambulation     BWS/vector training     MANUAL  CPT 02958 TOTAL TIME FOR SESSION 10 minutes    Stretching        HS        Hip rotators        Hip flexors        Calf strap stretch        PROM R ankle DF/EV   2 x 20 sec  2 x 20 sec  2 x 20 sec  2 x 30 sec  5 x 20 sec     n  n  n  Y   Mobilization        TC joint    10   Y   Massage DTM to left Medial HS pt prone _ n   Taping  prn      10/30/23 -          HOME EXERCISE PROGRAM  Created by Christine Mulvihill  Oct 30th, 2023  View videos at [email or web address]    5 Exercises    CALF STRETCH WITH TOWEL - GASTROCNEMIUS    While in a seated position, place a towel around the ball of your foot and pull your ankle back until a stretch is felt on your calf area.    Keep your knee in a straightened position during the stretch.    Video # [id number] Repeat 3 Times   Hold 20 Seconds   Complete 1 Set   Perform 2 Times a Day          CALF STRETCH WITH TOWEL - SOLEUS    While in a seated position, place a towel around the ball of your foot and pull your ankle back until a stretch is felt on your calf area.    Keep your knee in a bent position during the stretch.    Video # [id number] Repeat 3 Times   Hold 20 Seconds   Complete 1 Set   Perform 2 Times a Day          ANKLE PUMPS - AP    Bend your foot up and down at your ankle joint as shown.    Video # [id number] Repeat 3 Times   Hold 20 Seconds   Complete 1 Set   Perform 2 Times a Day          Ankle inversion eversion    With your legs out in front of you turn your feet in and then out Repeat 3 Times   Hold 20 Seconds   Perform 2 Times a Day          Runners Calf Stretch    1. Begin in a standing position with both hands against a wall and the legs staggered with the targeted leg behind.  2. Push the toes down into the ground in order to activate the calf muscles.  3. While maintaining this activation, press into the wall and  stretch the back of the calf, flexing the ankle as far as possible. Repeat 3 Times   Hold 20 Seconds   Complete 1 Set   Perform 2 Times a Day                    10/18/23  Emailed HEP to [email or web address]     HOME EXERCISE PROGRAM  Created by Christine Mulvihill  Oct 18th, 2023  View videos at [email or web address]    4 Exercises    HAMSTRING STRETCH WITH TOWEL    While lying down on your back, hook a towel or strap under your foot and draw up your leg until a stretch is felt along the backside of your leg.    Keep your knee in a straightened position during the stretch.    Video # [id number] Repeat 3 Times   Hold 30 Seconds   Complete 1 Set   Perform 2 Times a Day          QUADRICEPS STRETCH - SIDE LYING    Lie on your side with your target limb on top. Next, grab your target limb below the knee and pull your knee into a more bent position until a stretch is felt along the front of your thigh. Repeat 3 Times   Hold 30 Seconds   Complete 1 Set   Perform 2 Times a Day          SUPINE HIP EXTERNAL ROTATION STRETCH - MODIFIED NOAH OR FIGURE 4    While lying on your back with your leg crossed over your knee, use your hand and push the crossed knee away from you as shown.    Video # [id number] Repeat 3 Times   Hold 30 Seconds   Complete 1 Set   Perform 2 Times a Day          Piriformis Stretch    Lie on your back with your uninvolved leg bent and the ankle of your involved leg crossed over the top (as shown). Grab the knee of the involved leg with both hands and stretch toward your chest and then over in the direction of the opposite shoulder. Hold the stretch for 15 seconds, then relax.    Do 5 - 15 second stretches on each leg. Repeat 3 Times   Hold 30 Seconds   Perform 2 Times

## 2023-11-27 ENCOUNTER — HOSPITAL ENCOUNTER (OUTPATIENT)
Dept: PHYSICAL THERAPY | Facility: REHABILITATION | Age: 78
Setting detail: THERAPIES SERIES
Discharge: HOME | End: 2023-11-27
Attending: FAMILY MEDICINE
Payer: MEDICARE

## 2023-11-27 DIAGNOSIS — S70.12XA CONTUSION OF LEFT THIGH, INITIAL ENCOUNTER: Primary | ICD-10-CM

## 2023-11-27 DIAGNOSIS — M21.371 RIGHT FOOT DROP: ICD-10-CM

## 2023-11-27 DIAGNOSIS — R26.2 DIFFICULTY IN WALKING: ICD-10-CM

## 2023-11-27 PROCEDURE — 97140 MANUAL THERAPY 1/> REGIONS: CPT | Mod: GP,CQ

## 2023-11-27 PROCEDURE — 97110 THERAPEUTIC EXERCISES: CPT | Mod: GP,CQ

## 2023-11-27 NOTE — OP PT TREATMENT LOG
ORTHO PT FLOWSHEET    Exercises Current Session Time y/n   MODALITIES- HEAT/ICE  CPT 31397 TOTAL TIME FOR SESSION Not performed    Heat     Ice/Vasocompression     MODALITIES - E-STIM  CPT 06845   TOTAL TIME FOR SESSION Not performed    E-stim/H-Wave     MODALITIES - US TOTAL TIME FOR SESSION Not performed    US (CPT 24065)     Iontophoresis (CPT 92274)     Mechanical Traction     THER ACT  CPT 98800 TOTAL TIME FOR SESSION Brief    Pain, falls, med changes completed y   Pt education Pt educated regarding IE results and POC and need for active participation in skilled PT intervention and HEP.  Discussed with pt follow up with MD regarding possible right LE AFO to avoid further tripping and reinjury.  10/18/23 - reviewed possibility of AFO and pt shown normal hinged AFO, toe off and Spry step brace as options for dorsiflexion assistance in right LE to prevent further tripping/falling injuries.  Reviewed with pt heel toe gait pattern.  Also to discuss possibility of dorsiflexion assist brace vs/with trial of PT to improve mobility in right ankle as it is cause of left leg injury.   10/30/23 - pt education re:  Addition of right ankle to POC.  Instructed in HEP for right ankle.  See TE section.  Pt also educated in the use of his home EMS unit for contraction of anterior tibialis.  Set for 10 sec on/20 seconds off, 50 pps, 350 width.  Pt relayed understanding of set up and need to remove if burning or abnormal pain occurs  11/15/23 - reviewed with pt placement for electrodes for NMES to anterior tibialis on right LE to improve dorsiflexion.  Pt emailed picture of motor point with small electrode on proximal placement/motor  Point and larger pad on distal point as dispersive pad.  Pt appeared to understand.  Also discussed his visit with orthotist and he notes that he will most likely but a fabrid ankle strap with strap to attach to shoe laces to facilitate dorsiflexion and Mani Gabriel the prosthetist is going to start  "the process to get him a carbon graphite AFO.                           n   Assessment 10/30/23 - Reevaluation performed to add right foot drop/deviated gait to plan of care.  Results of right ankle evaluation as well as plan discussed with pt as well as need to monitor progress closely due to long nature of involvement of right ankle.  Also discussed improvement in left thigh and meeting with orthotist Mani Gabriel in future for assessment for possible AFO.  Pt agreeable to this and confirmed desire to meet with orthotist.    11/1/23 - performed 2MWT/6MWT and 30 second STS.             n   Body Mechanics/Work Training     THER EX  CPT 65847 TOTAL TIME FOR SESSION 45 minutes      CARDIOVASCULAR      Nu Step Level 6-3 x 10 minutes [facility]    Recumbent Bike seat #24 [place]  Gear 9 x 10 minutes     y   Elliptical     Treadmill     HEP 10/30/23 - Pt instructed in a primary HEP for right ankle including ankle dorsiflexion/plantarflexion/inv/ev, calf towel stretch for gastroc and soleus and wall calf stretch.  See chart copy of HEP2Go sheet as issued to pt.    STRENGTHENING     Supine ther-ex       Bridges       Bilateral knee fall outs       Marching with resistance       Ankle DF ecc control      Prone       HS curls   2 x 10 5 sec  Blue band   2 x 10 5 sec  Blue band   2 x 10 5 sec  Blue band   1 x 10 R foot manual resistance        2 x 10 B  Blue band  focus on eccentric control     y  y  y  y    y     Seated ther-ex      LAQ      STS      HS curls focus eccentric     10 x arms crossed over chest  10 x green band              Standing ther-ex       Hip flex       Hip abd       Hip ext       Hip add       Heel raises       SLS Add resistance next visit with pink band  2 x 10   2 x 10 standing on 4\" block with R hip abduction to clear toes  2 x 10 standing on 4\" block with R hip extension to clear toes   2 x 10   2 x 10  3 x 30 sec   y  y  y  y  y  y   STRETCHING     LE stretching       HS strap " stretch       Piriformis KTOS       Figure 4 push away       Gastroc stretch       Calf stretch off step   3 x 30 sec B  3 x 30 sec B  3 x 30 sec B  3 x 30 sec R only   In long sitting with strap - knee flex and knee ext     y  y  y  y   Other stretching       Incline calf stretch       REPEATED MOVEMENTS     NEURO RE-ED  CPT 10453 TOTAL TIME FOR SESSION Not performed    COORDINATION     POSTURAL RE-ED        PRE-GAIT ACTIVITIES      BALANCE TRAINING      Sitting balance     Standing balance     GAIT TRAINING  CPT 75623 TOTAL TIME FOR SESSION Brief billed in TA    Ambulation  2/6MWT n   Dynamic gait      Stair negotiation     Curb negotiation     Ramp negotiation     Outdoor ambulation     BWS/vector training     MANUAL  CPT 24316 TOTAL TIME FOR SESSION 10 minutes    Stretching        HS        Hip rotators        Hip flexors        Calf strap stretch        PROM R ankle DF/EV   2 x 20 sec  2 x 20 sec  2 x 20 sec  2 x 30 sec  5 x 20 sec     n  n  n  y   Mobilization        TC joint    10   n   Massage DTM to left Medial HS pt prone _ n   Taping  prn      10/30/23 -          HOME EXERCISE PROGRAM  Created by Christine Mulvihill  Oct 30th, 2023  View videos at [email or web address]    5 Exercises    CALF STRETCH WITH TOWEL - GASTROCNEMIUS    While in a seated position, place a towel around the ball of your foot and pull your ankle back until a stretch is felt on your calf area.    Keep your knee in a straightened position during the stretch.    Video # [id number] Repeat 3 Times   Hold 20 Seconds   Complete 1 Set   Perform 2 Times a Day          CALF STRETCH WITH TOWEL - SOLEUS    While in a seated position, place a towel around the ball of your foot and pull your ankle back until a stretch is felt on your calf area.    Keep your knee in a bent position during the stretch.    Video # [id number] Repeat 3 Times   Hold 20 Seconds   Complete 1 Set   Perform 2 Times a Day          ANKLE PUMPS - AP    Bend your foot up and down at  your ankle joint as shown.    Video # [id number] Repeat 3 Times   Hold 20 Seconds   Complete 1 Set   Perform 2 Times a Day          Ankle inversion eversion    With your legs out in front of you turn your feet in and then out Repeat 3 Times   Hold 20 Seconds   Perform 2 Times a Day          Runners Calf Stretch    1. Begin in a standing position with both hands against a wall and the legs staggered with the targeted leg behind.  2. Push the toes down into the ground in order to activate the calf muscles.  3. While maintaining this activation, press into the wall and stretch the back of the calf, flexing the ankle as far as possible. Repeat 3 Times   Hold 20 Seconds   Complete 1 Set   Perform 2 Times a Day                    10/18/23  Emailed HEP to [email or web address]     HOME EXERCISE PROGRAM  Created by Christine Mulvihill  Oct 18th, 2023  View videos at [email or web address]    4 Exercises    HAMSTRING STRETCH WITH TOWEL    While lying down on your back, hook a towel or strap under your foot and draw up your leg until a stretch is felt along the backside of your leg.    Keep your knee in a straightened position during the stretch.    Video # [id number] Repeat 3 Times   Hold 30 Seconds   Complete 1 Set   Perform 2 Times a Day          QUADRICEPS STRETCH - SIDE LYING    Lie on your side with your target limb on top. Next, grab your target limb below the knee and pull your knee into a more bent position until a stretch is felt along the front of your thigh. Repeat 3 Times   Hold 30 Seconds   Complete 1 Set   Perform 2 Times a Day          SUPINE HIP EXTERNAL ROTATION STRETCH - MODIFIED NOAH OR FIGURE 4    While lying on your back with your leg crossed over your knee, use your hand and push the crossed knee away from you as shown.    Video # [id number] Repeat 3 Times   Hold 30 Seconds   Complete 1 Set   Perform 2 Times a Day          Piriformis Stretch    Lie on your back with your uninvolved leg bent and the ankle of your  involved leg crossed over the top (as shown). Grab the knee of the involved leg with both hands and stretch toward your chest and then over in the direction of the opposite shoulder. Hold the stretch for 15 seconds, then relax.    Do 5 - 15 second stretches on each leg. Repeat 3 Times   Hold 30 Seconds   Perform 2 Times

## 2023-11-27 NOTE — PROGRESS NOTES
Physical Therapy Visit    PT DAILY NOTE FOR OUTPATIENT THERAPY    Patient: Les Garcia MRN: [id number]  : 1945 78 y.o.  Referring Physician: Eduardo Virk MD  Date of Visit: 2023    Certification Dates: 10/30/23 through 24    Diagnosis:   1. Contusion of left thigh, initial encounter    2. Right foot drop    3. Difficulty in walking        Chief Complaints:  pain and decreased adl functioning    Precautions:   Existing Precautions/Restrictions: cardiac  Precautions comments: MI, cardiac stent, right foot drop      TODAY'S VISIT    Time In Session:  Start Time: 905  Stop Time: 1000  Time Calculation (min): 55 min   History/Vitals/Pain/Encounter Info - 23        Injury History/Precautions/Daily Required Info    Document Type daily treatment     Primary Therapist Christine Mulvihill, PT     Chief Complaint/Reason for Visit  pain and decreased adl functioning     Onset of Illness/Injury or Date of Surgery 23     Referring Physician Eduardo Virk MD     Existing Precautions/Restrictions cardiac     Precautions comments MI, cardiac stent, right foot drop     History of present illness/functional impairment On  tripped on his right drop foot and twisted around landing on his left hip area.  Pt was seen in ED and special tests were performed (-) for fractures. Pt was away at the time on vacation so followed up with ortho upon return and was referred to outpatient PT. 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   He notes that he does have some dull pain lingering however it is overall much improved. adls:  increased pain with walking fast,  aching in leg with adls with associated decreased mobility.   Pts goals:  eliminate left Hamstring pain and improve mobility.  He would also like to not trip anymore so would like his right foot drop addressed at some  point including possible AFOs.     Patient/Family/Caregiver Comments/Observations Les arrived with no pain, falls or med changes, just some knee stiffness.     Patient reported fall since last visit No        Pain Assessment    Currently in pain No/Denies        Pain Intervention    Intervention  exercise     Post Intervention Comments no change in pain         Services    Do You Speak a Language Other Than English at Home? no                Daily Treatment Assessment and Plan - 11/27/23 0902        Daily Treatment Assessment and Plan    Progress toward goals Progressing     Daily Outcome Summary Les tolerated session well. He was able to increase to blue band for mat strengthening. His hamstring is with only intermittent soreness.     Plan and Recommendations Continue with POC per primary PT.  Awaiting carbon fiber AFO                         Today's Treatment:    ORTHO PT FLOWSHEET    Exercises Current Session Time y/n   MODALITIES- HEAT/ICE  CPT 23405 TOTAL TIME FOR SESSION Not performed    Heat     Ice/Vasocompression     MODALITIES - E-STIM  CPT 74177   TOTAL TIME FOR SESSION Not performed    E-stim/H-Wave     MODALITIES - US TOTAL TIME FOR SESSION Not performed    US (CPT 90474)     Iontophoresis (CPT 50443)     Mechanical Traction     THER ACT  CPT 15633 TOTAL TIME FOR SESSION Brief    Pain, falls, med changes completed y   Pt education Pt educated regarding IE results and POC and need for active participation in skilled PT intervention and HEP.  Discussed with pt follow up with MD regarding possible right LE AFO to avoid further tripping and reinjury.  10/18/23 - reviewed possibility of AFO and pt shown normal hinged AFO, toe off and Spry step brace as options for dorsiflexion assistance in right LE to prevent further tripping/falling injuries.  Reviewed with pt heel toe gait pattern.  Also to discuss possibility of dorsiflexion assist brace vs/with trial of PT to improve mobility in right ankle  as it is cause of left leg injury.   10/30/23 - pt education re:  Addition of right ankle to POC.  Instructed in HEP for right ankle.  See TE section.  Pt also educated in the use of his home EMS unit for contraction of anterior tibialis.  Set for 10 sec on/20 seconds off, 50 pps, 350 width.  Pt relayed understanding of set up and need to remove if burning or abnormal pain occurs  11/15/23 - reviewed with pt placement for electrodes for NMES to anterior tibialis on right LE to improve dorsiflexion.  Pt emailed picture of motor point with small electrode on proximal placement/motor  Point and larger pad on distal point as dispersive pad.  Pt appeared to understand.  Also discussed his visit with orthotist and he notes that he will most likely but a fabrid ankle strap with strap to attach to shoe laces to facilitate dorsiflexion and Mani Gabriel the prosthetist is going to start the process to get him a carbon graphite AFO.                           n   Assessment 10/30/23 - Reevaluation performed to add right foot drop/deviated gait to plan of care.  Results of right ankle evaluation as well as plan discussed with pt as well as need to monitor progress closely due to long nature of involvement of right ankle.  Also discussed improvement in left thigh and meeting with orthotist Mani Gabriel in future for assessment for possible AFO.  Pt agreeable to this and confirmed desire to meet with orthotist.    11/1/23 - performed 2MWT/6MWT and 30 second STS.             n   Body Mechanics/Work Training     THER EX  CPT 06308 TOTAL TIME FOR SESSION 45 minutes      CARDIOVASCULAR      Nu Step Level 6-3 x 10 minutes [facility]    Recumbent Bike seat #24 [place]  Gear 9 x 10 minutes     y   Elliptical     Treadmill     HEP 10/30/23 - Pt instructed in a primary HEP for right ankle including ankle dorsiflexion/plantarflexion/inv/ev, calf towel stretch for gastroc and soleus and wall calf stretch.  See chart copy of  "HEP2Go sheet as issued to pt.    STRENGTHENING     Supine ther-ex       Bridges       Bilateral knee fall outs       Marching with resistance       Ankle DF ecc control      Prone       HS curls   2 x 10 5 sec  Blue band   2 x 10 5 sec  Blue band   2 x 10 5 sec  Blue band   1 x 10 R foot manual resistance        2 x 10 B  Blue band  focus on eccentric control     y  y  y  y    y     Seated ther-ex      LAQ      STS      HS curls focus eccentric     10 x arms crossed over chest  10 x green band              Standing ther-ex       Hip flex       Hip abd       Hip ext       Hip add       Heel raises       SLS Add resistance next visit with pink band  2 x 10   2 x 10 standing on 4\" block with R hip abduction to clear toes  2 x 10 standing on 4\" block with R hip extension to clear toes   2 x 10   2 x 10  3 x 30 sec   y  y  y  y  y  y   STRETCHING     LE stretching       HS strap stretch       Piriformis KTOS       Figure 4 push away       Gastroc stretch       Calf stretch off step   3 x 30 sec B  3 x 30 sec B  3 x 30 sec B  3 x 30 sec R only   In long sitting with strap - knee flex and knee ext     y  y  y  y   Other stretching       Incline calf stretch       REPEATED MOVEMENTS     NEURO RE-ED  CPT 25433 TOTAL TIME FOR SESSION Not performed    COORDINATION     POSTURAL RE-ED        PRE-GAIT ACTIVITIES      BALANCE TRAINING      Sitting balance     Standing balance     GAIT TRAINING  CPT 59303 TOTAL TIME FOR SESSION Brief billed in TA    Ambulation  2/6MWT n   Dynamic gait      Stair negotiation     Curb negotiation     Ramp negotiation     Outdoor ambulation     BWS/vector training     MANUAL  CPT 99927 TOTAL TIME FOR SESSION 10 minutes    Stretching        HS        Hip rotators        Hip flexors        Calf strap stretch        PROM R ankle DF/EV   2 x 20 sec  2 x 20 sec  2 x 20 sec  2 x 30 sec  5 x 20 sec     n  n  n  y   Mobilization        TC joint    10   n   Massage DTM to left Medial HS pt prone _ n   Taping  " prn      10/30/23 -          HOME EXERCISE PROGRAM  Created by Christine Mulvihill  Oct 30th, 2023  View videos at [email or web address]    5 Exercises    CALF STRETCH WITH TOWEL - GASTROCNEMIUS    While in a seated position, place a towel around the ball of your foot and pull your ankle back until a stretch is felt on your calf area.    Keep your knee in a straightened position during the stretch.    Video # [id number] Repeat 3 Times   Hold 20 Seconds   Complete 1 Set   Perform 2 Times a Day          CALF STRETCH WITH TOWEL - SOLEUS    While in a seated position, place a towel around the ball of your foot and pull your ankle back until a stretch is felt on your calf area.    Keep your knee in a bent position during the stretch.    Video # [id number] Repeat 3 Times   Hold 20 Seconds   Complete 1 Set   Perform 2 Times a Day          ANKLE PUMPS - AP    Bend your foot up and down at your ankle joint as shown.    Video # [id number] Repeat 3 Times   Hold 20 Seconds   Complete 1 Set   Perform 2 Times a Day          Ankle inversion eversion    With your legs out in front of you turn your feet in and then out Repeat 3 Times   Hold 20 Seconds   Perform 2 Times a Day          Runners Calf Stretch    1. Begin in a standing position with both hands against a wall and the legs staggered with the targeted leg behind.  2. Push the toes down into the ground in order to activate the calf muscles.  3. While maintaining this activation, press into the wall and stretch the back of the calf, flexing the ankle as far as possible. Repeat 3 Times   Hold 20 Seconds   Complete 1 Set   Perform 2 Times a Day                    10/18/23  Emailed HEP to [email or web address]     HOME EXERCISE PROGRAM  Created by Christine Mulvihill  Oct 18th, 2023  View videos at [email or web address]    4 Exercises    HAMSTRING STRETCH WITH TOWEL    While lying down on your back, hook a towel or strap under your foot and draw up your leg until a stretch is felt along the backside of  your leg.    Keep your knee in a straightened position during the stretch.    Video # [id number] Repeat 3 Times   Hold 30 Seconds   Complete 1 Set   Perform 2 Times a Day          QUADRICEPS STRETCH - SIDE LYING    Lie on your side with your target limb on top. Next, grab your target limb below the knee and pull your knee into a more bent position until a stretch is felt along the front of your thigh. Repeat 3 Times   Hold 30 Seconds   Complete 1 Set   Perform 2 Times a Day          SUPINE HIP EXTERNAL ROTATION STRETCH - MODIFIED NOAH OR FIGURE 4    While lying on your back with your leg crossed over your knee, use your hand and push the crossed knee away from you as shown.    Video # [id number] Repeat 3 Times   Hold 30 Seconds   Complete 1 Set   Perform 2 Times a Day          Piriformis Stretch    Lie on your back with your uninvolved leg bent and the ankle of your involved leg crossed over the top (as shown). Grab the knee of the involved leg with both hands and stretch toward your chest and then over in the direction of the opposite shoulder. Hold the stretch for 15 seconds, then relax.    Do 5 - 15 second stretches on each leg. Repeat 3 Times   Hold 30 Seconds   Perform 2 Times

## 2023-11-29 ENCOUNTER — HOSPITAL ENCOUNTER (OUTPATIENT)
Dept: PHYSICAL THERAPY | Facility: REHABILITATION | Age: 78
Setting detail: THERAPIES SERIES
Discharge: HOME | End: 2023-11-29
Attending: FAMILY MEDICINE
Payer: MEDICARE

## 2023-11-29 DIAGNOSIS — M21.371 RIGHT FOOT DROP: ICD-10-CM

## 2023-11-29 DIAGNOSIS — R26.2 DIFFICULTY IN WALKING: ICD-10-CM

## 2023-11-29 DIAGNOSIS — S70.12XA CONTUSION OF LEFT THIGH, INITIAL ENCOUNTER: Primary | ICD-10-CM

## 2023-11-29 PROCEDURE — 97140 MANUAL THERAPY 1/> REGIONS: CPT | Mod: GP

## 2023-11-29 PROCEDURE — 97530 THERAPEUTIC ACTIVITIES: CPT | Mod: GP

## 2023-11-29 PROCEDURE — 97110 THERAPEUTIC EXERCISES: CPT | Mod: GP

## 2023-11-29 NOTE — OP PT TREATMENT LOG
ORTHO PT FLOWSHEET    Exercises Current Session Time y/n   MODALITIES- HEAT/ICE  CPT 28548 TOTAL TIME FOR SESSION Not performed    Heat     Ice/Vasocompression     MODALITIES - E-STIM  CPT 44731   TOTAL TIME FOR SESSION Not performed    E-stim/H-Wave     MODALITIES - US TOTAL TIME FOR SESSION Not performed    US (CPT 85533)     Iontophoresis (CPT 50265)     Mechanical Traction     THER ACT  CPT 18597 TOTAL TIME FOR SESSION 20 minutes    Pain, falls, med changes completed y   Pt education Pt educated regarding IE results and POC and need for active participation in skilled PT intervention and HEP.  Discussed with pt follow up with MD regarding possible right LE AFO to avoid further tripping and reinjury.  10/18/23 - reviewed possibility of AFO and pt shown normal hinged AFO, toe off and Spry step brace as options for dorsiflexion assistance in right LE to prevent further tripping/falling injuries.  Reviewed with pt heel toe gait pattern.  Also to discuss possibility of dorsiflexion assist brace vs/with trial of PT to improve mobility in right ankle as it is cause of left leg injury.   10/30/23 - pt education re:  Addition of right ankle to POC.  Instructed in HEP for right ankle.  See TE section.  Pt also educated in the use of his home EMS unit for contraction of anterior tibialis.  Set for 10 sec on/20 seconds off, 50 pps, 350 width.  Pt relayed understanding of set up and need to remove if burning or abnormal pain occurs  11/15/23 - reviewed with pt placement for electrodes for NMES to anterior tibialis on right LE to improve dorsiflexion.  Pt emailed picture of motor point with small electrode on proximal placement/motor  Point and larger pad on distal point as dispersive pad.  Pt appeared to understand.  Also discussed his visit with orthotist and he notes that he will most likely but a fabrid ankle strap with strap to attach to shoe laces to facilitate dorsiflexion and Mani Gabriel the prosthetist is going to  start the process to get him a carbon graphite AFO.  11/29: reviewed placement of electrodes for NMES to anterior tib                                         y   Assessment 10/30/23 - Reevaluation performed to add right foot drop/deviated gait to plan of care.  Results of right ankle evaluation as well as plan discussed with pt as well as need to monitor progress closely due to long nature of involvement of right ankle.  Also discussed improvement in left thigh and meeting with orthotist Mani Gabriel in future for assessment for possible AFO.  Pt agreeable to this and confirmed desire to meet with orthotist.    11/1/23 - performed 2MWT/6MWT and 30 second STS.  11/29: FAAM, LEFS, ankle ROM, discussion of pain levels and subjective improvement, gait speed             n  y   Body Mechanics/Work Training     THER EX  CPT 05708 TOTAL TIME FOR SESSION 25 minutes      CARDIOVASCULAR      Nu Step Level 6-3 x 10 minutes [facility]    Recumbent Bike seat #24  x 10 minutes   Evening [place]  Gear 9     n   Elliptical     Treadmill     HEP 10/30/23 - Pt instructed in a primary HEP for right ankle including ankle dorsiflexion/plantarflexion/inv/ev, calf towel stretch for gastroc and soleus and wall calf stretch.  See chart copy of HEP2Go sheet as issued to pt.    STRENGTHENING     Supine ther-ex       Bridges       Bilateral knee fall outs       Marching with resistance       Ankle DF ecc control           4 way ankle  Prone       HS curls   2 x 10 5 sec  Green band    Blue band next visit  2 x 10 5 sec  Green band    Blue band next visit  2 x 10 5 sec  Green band    Blue band next visit  1 x 10 R foot manual resistance      2 x 10 R foot Blue TB   3 x 10   OTB focus on eccentric control           y   Seated ther-ex      LAQ      STS      HS curls focus eccentric     10 x arms crossed over chest  10 x green band  Blue band next visit            Standing ther-ex       Hip flex       Hip abd       Hip ext       Hip add      " Heel raises       SLS      Tib Anterior Lowers Add resistance next visit with pink band  2 x 10   2 x 10   2 x 10 standing on 4\" block with R hip extension to clear toes   2 x 10   2/10  3/30 sec  1 x 10   n  n  n  n  n  n  y   STRETCHING     LE stretching       HS strap stretch       Piriformis KTOS       Figure 4 push away       Gastroc stretch       Ankle DF self mobilization    3 x 30 sec B  3 x 30 sec B  3 x 30 sec B  3 x 30 sec R only   In long sitting with strap - knee flex and knee ext  10 x 10 sec R only foot on step with tib translation into DF   y  n  n  Y  y   Other stretching       Incline calf stretch       REPEATED MOVEMENTS     NEURO RE-ED  CPT 94724 TOTAL TIME FOR SESSION Not performed    COORDINATION     POSTURAL RE-ED        PRE-GAIT ACTIVITIES      BALANCE TRAINING      Sitting balance     Standing balance     GAIT TRAINING  CPT 33261 TOTAL TIME FOR SESSION Brief billed in TA    Ambulation  2/6MWT n   Dynamic gait      Stair negotiation     Curb negotiation     Ramp negotiation     Outdoor ambulation     BWS/vector training     MANUAL  CPT 01108 TOTAL TIME FOR SESSION 10 minutes    Stretching        HS        Hip rotators        Hip flexors        Calf strap stretch        PROM R ankle DF/EV   2 x 20 sec  2 x 20 sec  2 x 20 sec  2 x 30 sec  5 x 20 sec     n  n  n  Y   Mobilization        TC joint    10   Y   Massage DTM to left Medial HS pt prone _ n   Taping  prn      10/30/23 -          HOME EXERCISE PROGRAM  Created by Christine Mulvihill  Oct 30th, 2023  View videos at [email or web address]    5 Exercises    CALF STRETCH WITH TOWEL - GASTROCNEMIUS    While in a seated position, place a towel around the ball of your foot and pull your ankle back until a stretch is felt on your calf area.    Keep your knee in a straightened position during the stretch.    Video # [id number] Repeat 3 Times   Hold 20 Seconds   Complete 1 Set   Perform 2 Times a Day          CALF STRETCH WITH TOWEL - SOLEUS    While in a " seated position, place a towel around the ball of your foot and pull your ankle back until a stretch is felt on your calf area.    Keep your knee in a bent position during the stretch.    Video # [id number] Repeat 3 Times   Hold 20 Seconds   Complete 1 Set   Perform 2 Times a Day          ANKLE PUMPS - AP    Bend your foot up and down at your ankle joint as shown.    Video # [id number] Repeat 3 Times   Hold 20 Seconds   Complete 1 Set   Perform 2 Times a Day          Ankle inversion eversion    With your legs out in front of you turn your feet in and then out Repeat 3 Times   Hold 20 Seconds   Perform 2 Times a Day          Runners Calf Stretch    1. Begin in a standing position with both hands against a wall and the legs staggered with the targeted leg behind.  2. Push the toes down into the ground in order to activate the calf muscles.  3. While maintaining this activation, press into the wall and stretch the back of the calf, flexing the ankle as far as possible. Repeat 3 Times   Hold 20 Seconds   Complete 1 Set   Perform 2 Times a Day                    10/18/23  Emailed HEP to [email or web address]     HOME EXERCISE PROGRAM  Created by Christine Mulvihill  Oct 18th, 2023  View videos at [email or web address]    4 Exercises    HAMSTRING STRETCH WITH TOWEL    While lying down on your back, hook a towel or strap under your foot and draw up your leg until a stretch is felt along the backside of your leg.    Keep your knee in a straightened position during the stretch.    Video # [id number] Repeat 3 Times   Hold 30 Seconds   Complete 1 Set   Perform 2 Times a Day          QUADRICEPS STRETCH - SIDE LYING    Lie on your side with your target limb on top. Next, grab your target limb below the knee and pull your knee into a more bent position until a stretch is felt along the front of your thigh. Repeat 3 Times   Hold 30 Seconds   Complete 1 Set   Perform 2 Times a Day          SUPINE HIP EXTERNAL ROTATION STRETCH - MODIFIED NOAH OR  FIGURE 4    While lying on your back with your leg crossed over your knee, use your hand and push the crossed knee away from you as shown.    Video # [id number] Repeat 3 Times   Hold 30 Seconds   Complete 1 Set   Perform 2 Times a Day          Piriformis Stretch    Lie on your back with your uninvolved leg bent and the ankle of your involved leg crossed over the top (as shown). Grab the knee of the involved leg with both hands and stretch toward your chest and then over in the direction of the opposite shoulder. Hold the stretch for 15 seconds, then relax.    Do 5 - 15 second stretches on each leg. Repeat 3 Times   Hold 30 Seconds   Perform 2 Times

## 2023-11-29 NOTE — PROGRESS NOTES
Physical Therapy Progress Note    PT PROGRESS NOTE FOR OUTPATIENT THERAPY    Patient: Les Garcia   MRN: [id number]  : 1945 78 y.o.    Referring Physician: Eduardo Virk MD  Date of Visit: 2023    Certification Dates: 10/30/23 through 24    Recommended Frequency & Duration:  2 times/week for up to 3 months        Diagnosis:   1. Contusion of left thigh, initial encounter    2. Right foot drop    3. Difficulty in walking        Chief Complaints:  No chief complaint on file.      Precautions:   Existing Precautions/Restrictions: cardiac  Precautions comments: MI, cardiac stent, right foot drop    TODAY'S VISIT:    Time In Session:  Start Time: 08  Stop Time: 08  Time Calculation (min): 55 min   General Information - 23 08        Session Details    Document Type progress note     Mode of Treatment individual therapy        General Information    Onset of Illness/Injury or Date of Surgery 23     Referring Physician Eduardo Virk MD     History of present illness/functional impairment On  tripped on his right drop foot and twisted around landing on his left hip area.  Pt was seen in ED and special tests were performed (-) for fractures. Pt was away at the time on vacation so followed up with ortho upon return and was referred to outpatient PT. 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   He notes that he does have some dull pain lingering however it is overall much improved. adls:  increased pain with walking fast,  aching in leg with adls with associated decreased mobility.   Pts goals:  eliminate left Hamstring pain and improve mobility.  He would also like to not trip anymore so would like his right foot drop addressed at some point including possible AFOs.     Patient/Family/Caregiver Comments/Observations Pt reports that he has seen minimal  changes in his R foot drop since starting PT. He met with orthotist and will be recieving a carbonfiber AFO to address DF weakness. Pt will be traveling next week and planning on discharging when he returns.     Existing Precautions/Restrictions cardiac     Precautions comments MI, cardiac stent, right foot drop         Services    Do You Speak a Language Other Than English at Home? no                Daily Falls Screen - 11/29/23 0805        Daily Falls Assessment    Patient reported fall since last visit No                Pain/Vitals - 11/29/23 0805        Pain Assessment    Currently in pain No/Denies                PT - 11/29/23 0805        Physical Therapy    Physical Therapy Special[facility] PT        PT Plan    Frequency of treatment 2 times/week     PT Duration 3 months     PT Cert From 10/30/23     PT Cert To 01/30/24     Date PT POC was sent to provider 10/30/23     Signed PT Plan of Care received?  Yes                Assessment and Plan - 11/29/23 0805        Assessment    Clinical Assessment Pt presents to physical therapy with L hamstring strain and R drop foot.  Pt has attended 14 visits to address chief complaints. Pt's FAAM = went from 69% to 71% function. Pt made no signficant improvement in ankle strength or ROM since IE.  Pt's LEFS score indicated 80% function today. Pt is awaiting carbon fiber AFO from orthotist. Pt will benefit from continued skilled physical therapy to address impairments and achieve LTG's.     Plan and Recommendations Continue with POC per primary PT.  Awaiting carbon fiber AFO; Likely discharge next visit                 OBJECTIVE MEASUREMENTS/DATA:    ROM    Range of Motion - 11/29/23 1000        RIGHT: Lower Extremity PROM Assessment    Ankle Dorsiflexion  -14 degrees        RIGHT: Lower Extremity AROM Assessment    Ankle Dorsiflexion   -14 degrees     Ankle Plantarflexion   50 degrees     Ankle Inversion   28 degrees     Ankle Eversion   6 degrees                MMT    Manual Muscle Tests - 11/29/23 0805        RIGHT: Lower Extremity Manual Muscle Test Assessment    Ankle Dorsiflexion gross movement (2/5) poor     Ankle Plantarflexion gross movement (4+/5) good plus     Ankle Inversion gross movement (4+/5) good plus     Ankle Eversion gross movement (4/5) good               Outcome Measures    PT Outcome Measures - 11/29/23 0805        Subjective Outcome Measures    LEFS 25% disabled     FAAM 80% function                 ROM and MMT        10/14/2023    23:00 10/30/2023 11/9/2023    11:00 11/29/2023   PT LE ROM Measurements   PROM: Right Hip Flexion 80 degrees       SLR      AROM: Right Hip Flexion 90 degrees 100 degrees     PROM: Left Hip Flexion 58 degrees       SLR 70 degrees     AROM: Left Hip Flexion 102 degrees      AROM: Left Hip Extension 113 degrees      PROM: Right Hip IR 22 degrees      PROM: Left Hip IR 14 degrees      PROM: Right Hip ER 45 degrees      PROM: Left Hip ER 45 degrees      AROM: Right Knee Flexion 132      AROM: Right Knee Extension 0      PROM: Right Ankle DF   -14 degrees       -14 knee straight,  knee bent -14 degrees   AROM: Right Ankle DF -15 degrees -12 degrees -20 degrees       knee flexed -20,   knee ext = - 20 -14 degrees   AROM: Left Ankle DF 8 degrees 10 degrees     AROM: Right Ankle PF 58 degrees 48 degrees  50 degrees   AROM: Left Ankle PF 55 degrees 56 degrees     AROM: Right Ankle Inversion 24 degrees 32 degrees  28 degrees   AROM: Left Ankle Inversion 30 degrees 34 degrees     AROM: Right Ankle Eversion 4 degrees 6 degrees  6 degrees   AROM: Left Ankle Eversion 18 degrees 18 degrees     PT LE MMT   Right Hip Flexion (4+/5) good plus (4+/5) good plus     Left Hip Flexion (5/5) normal (5/5) normal     Right Hip Extension (5/5) normal      Left Hip Extension (4/5) good      Right Hip ABD (5/5) normal (5/5) normal     Left Hip ABD (4+/5) good plus (5/5) normal     Right Hip ADD (5/5) normal (5/5) normal     Left Hip ADD  (4+/5) good plus (4+/5) good plus     Right Knee Flexion (4/5) good (4/5) good     Left Knee Flexion (4/5) good (4+/5) good plus     Right Knee Extension (5/5) normal (4+/5) good plus     Left Knee Extension (5/5) normal (5/5) normal     Right Ankle DF (2/5) poor (2/5) poor  (2/5) poor   Left Ankle DF (5/5) normal      Right Ankle PF (4+/5) good plus (4+/5) good plus  (4+/5) good plus   Left Ankle PF (5/5) normal      Right Ankle Inversion (4/5) good   (4+/5) good plus   Left Ankle Inversion (4+/5) good plus      Right Ankle Eversion (4-/5) good minus (4-/5) good minus  (4/5) good   Left Ankle Eversion (4-/5) good minus        Outcome Measures        10/11/2023    18:00 10/30/2023    08:07 11/1/2023    08:13 11/29/2023    08:05   PT OBJECTIVE Outcome Measures   6 Minute Walk Test   1354    2 Minute Walk Test   480       Pt with one incident of tripping due to not clearing the floor with right foot.    30 Second Sit to Stand 10 repetitions       no hands  verbal cueing for full stand  12 repetitions       improved 2 repetitions from IE    PT SUBJECTIVE Outcome Measures   LEFS 19/80 = 24% functioning, 76% disabled 56/80 regarding thigh = 70% functioning - 30% disabled  25% disabled   PSFS % Score  30 % 30 %    FAAM  58/84=  69% functioning right ankle  80% function       Today's Treatment::    Education provided:  Yes: See treatment log for details of education provided    ORTHO PT FLOWSHEET    Exercises Current Session Time y/n   MODALITIES- HEAT/ICE  CPT 30871 TOTAL TIME FOR SESSION Not performed    Heat     Ice/Vasocompression     MODALITIES - E-STIM  CPT 08186   TOTAL TIME FOR SESSION Not performed    E-stim/H-Wave     MODALITIES - US TOTAL TIME FOR SESSION Not performed    US (CPT 32296)     Iontophoresis (CPT 45570)     Mechanical Traction     THER ACT  CPT 80532 TOTAL TIME FOR SESSION 20 minutes    Pain, falls, med changes completed y   Pt education Pt educated regarding IE results and POC and need for active  participation in skilled PT intervention and HEP.  Discussed with pt follow up with MD regarding possible right LE AFO to avoid further tripping and reinjury.  10/18/23 - reviewed possibility of AFO and pt shown normal hinged AFO, toe off and Spry step brace as options for dorsiflexion assistance in right LE to prevent further tripping/falling injuries.  Reviewed with pt heel toe gait pattern.  Also to discuss possibility of dorsiflexion assist brace vs/with trial of PT to improve mobility in right ankle as it is cause of left leg injury.   10/30/23 - pt education re:  Addition of right ankle to POC.  Instructed in HEP for right ankle.  See TE section.  Pt also educated in the use of his home EMS unit for contraction of anterior tibialis.  Set for 10 sec on/20 seconds off, 50 pps, 350 width.  Pt relayed understanding of set up and need to remove if burning or abnormal pain occurs  11/15/23 - reviewed with pt placement for electrodes for NMES to anterior tibialis on right LE to improve dorsiflexion.  Pt emailed picture of motor point with small electrode on proximal placement/motor  Point and larger pad on distal point as dispersive pad.  Pt appeared to understand.  Also discussed his visit with orthotist and he notes that he will most likely but a fabrid ankle strap with strap to attach to shoe laces to facilitate dorsiflexion and Mani Gabriel the prosthetist is going to start the process to get him a carbon graphite AFO.  11/29: reviewed placement of electrodes for NMES to anterior tib                                         y   Assessment 10/30/23 - Reevaluation performed to add right foot drop/deviated gait to plan of care.  Results of right ankle evaluation as well as plan discussed with pt as well as need to monitor progress closely due to long nature of involvement of right ankle.  Also discussed improvement in left thigh and meeting with orthotist Mani Gabriel in future for assessment for possible AFO.  Pt  "agreeable to this and confirmed desire to meet with orthotist.    11/1/23 - performed 2MWT/6MWT and 30 second STS.  11/29: FAAM, LEFS, ankle ROM, discussion of pain levels and subjective improvement, gait speed             n  y   Body Mechanics/Work Training     THER EX  CPT 60433 TOTAL TIME FOR SESSION 25 minutes      CARDIOVASCULAR      Nu Step Level 6-3 x 10 minutes [facility]    Recumbent Bike seat #24  x 10 minutes   Evening [employer]  Gear 9     n   Elliptical     Treadmill     HEP 10/30/23 - Pt instructed in a primary HEP for right ankle including ankle dorsiflexion/plantarflexion/inv/ev, calf towel stretch for gastroc and soleus and wall calf stretch.  See chart copy of HEP2Go sheet as issued to pt.    STRENGTHENING     Supine ther-ex       Bridges       Bilateral knee fall outs       Marching with resistance       Ankle DF ecc control           4 way ankle  Prone       HS curls   2 x 10 5 sec  Green band    Blue band next visit  2 x 10 5 sec  Green band    Blue band next visit  2 x 10 5 sec  Green band    Blue band next visit  1 x 10 R foot manual resistance      2 x 10 R foot Blue TB   3 x 10   OTB focus on eccentric control           y   Seated ther-ex      LAQ      STS      HS curls focus eccentric     10 x arms crossed over chest  10 x green band  Blue band next visit            Standing ther-ex       Hip flex       Hip abd       Hip ext       Hip add      Heel raises       SLS      Tib Anterior Lowers Add resistance next visit with pink band  2 x 10   2 x 10   2 x 10 standing on 4\" block with R hip extension to clear toes   2 x 10   2/10  3/30 sec  1 x 10   n  n  n  n  n  n  y   STRETCHING     LE stretching       HS strap stretch       Piriformis KTOS       Figure 4 push away       Gastroc stretch       Ankle DF self mobilization    3 x 30 sec B  3 x 30 sec B  3 x 30 sec B  3 x 30 sec R only   In long sitting with strap - knee flex and knee ext  10 x 10 sec R only foot on step with tib " translation into DF   y  n  n  Y  y   Other stretching       Incline calf stretch       REPEATED MOVEMENTS     NEURO RE-ED  CPT 99487 TOTAL TIME FOR SESSION Not performed    COORDINATION     POSTURAL RE-ED        PRE-GAIT ACTIVITIES      BALANCE TRAINING      Sitting balance     Standing balance     GAIT TRAINING  CPT 93195 TOTAL TIME FOR SESSION Brief billed in TA    Ambulation  2/6MWT n   Dynamic gait      Stair negotiation     Curb negotiation     Ramp negotiation     Outdoor ambulation     BWS/vector training     MANUAL  CPT 77812 TOTAL TIME FOR SESSION 10 minutes    Stretching        HS        Hip rotators        Hip flexors        Calf strap stretch        PROM R ankle DF/EV   2 x 20 sec  2 x 20 sec  2 x 20 sec  2 x 30 sec  5 x 20 sec     n  n  n  Y   Mobilization        TC joint    10   Y   Massage DTM to left Medial HS pt prone _ n   Taping  prn      10/30/23 -          HOME EXERCISE PROGRAM  Created by Christine Mulvihill  Oct 30th, 2023  View videos at [email or web address]    5 Exercises    CALF STRETCH WITH TOWEL - GASTROCNEMIUS    While in a seated position, place a towel around the ball of your foot and pull your ankle back until a stretch is felt on your calf area.    Keep your knee in a straightened position during the stretch.    Video # [id number] Repeat 3 Times   Hold 20 Seconds   Complete 1 Set   Perform 2 Times a Day          CALF STRETCH WITH TOWEL - SOLEUS    While in a seated position, place a towel around the ball of your foot and pull your ankle back until a stretch is felt on your calf area.    Keep your knee in a bent position during the stretch.    Video # [id number] Repeat 3 Times   Hold 20 Seconds   Complete 1 Set   Perform 2 Times a Day          ANKLE PUMPS - AP    Bend your foot up and down at your ankle joint as shown.    Video # [id number] Repeat 3 Times   Hold 20 Seconds   Complete 1 Set   Perform 2 Times a Day          Ankle inversion eversion    With your legs out in front of you turn your  feet in and then out Repeat 3 Times   Hold 20 Seconds   Perform 2 Times a Day          Runners Calf Stretch    1. Begin in a standing position with both hands against a wall and the legs staggered with the targeted leg behind.  2. Push the toes down into the ground in order to activate the calf muscles.  3. While maintaining this activation, press into the wall and stretch the back of the calf, flexing the ankle as far as possible. Repeat 3 Times   Hold 20 Seconds   Complete 1 Set   Perform 2 Times a Day                    10/18/23  Emailed HEP to [email or web address]     HOME EXERCISE PROGRAM  Created by Christine Mulvihill  Oct 18th, 2023  View videos at [email or web address]    4 Exercises    HAMSTRING STRETCH WITH TOWEL    While lying down on your back, hook a towel or strap under your foot and draw up your leg until a stretch is felt along the backside of your leg.    Keep your knee in a straightened position during the stretch.    Video # [id number] Repeat 3 Times   Hold 30 Seconds   Complete 1 Set   Perform 2 Times a Day          QUADRICEPS STRETCH - SIDE LYING    Lie on your side with your target limb on top. Next, grab your target limb below the knee and pull your knee into a more bent position until a stretch is felt along the front of your thigh. Repeat 3 Times   Hold 30 Seconds   Complete 1 Set   Perform 2 Times a Day          SUPINE HIP EXTERNAL ROTATION STRETCH - MODIFIED NOAH OR FIGURE 4    While lying on your back with your leg crossed over your knee, use your hand and push the crossed knee away from you as shown.    Video # [id number] Repeat 3 Times   Hold 30 Seconds   Complete 1 Set   Perform 2 Times a Day          Piriformis Stretch    Lie on your back with your uninvolved leg bent and the ankle of your involved leg crossed over the top (as shown). Grab the knee of the involved leg with both hands and stretch toward your chest and then over in the direction of the opposite shoulder. Hold the stretch for 15  "seconds, then relax.    Do 5 - 15 second stretches on each leg. Repeat 3 Times   Hold 30 Seconds   Perform 2 Times                       Goals Addressed                 This Visit's Progress     Mutually agreed upon pain goal        Mutually agreed upon pain goal:  Les will be able to ambulate at a fast pace without complaints of left leg pain.    11/29: Pt reports that this has improved since he had become more aware of his gait.        PT left leg/gait dysfunction Goals - R drop foot added 10/30/23            Short & Long Term Goals Time Frame Result Comment/Progress   Assess 6mWT 2 weeks   NT     Pt will be independent in primary HEP 4-6 weeks  Met for L  New for R     Patient pain will decrease from >5/10 to less <3/10 for all functional mobility  4-6 weeks   MET  0/10     Pt with decreased pain while \"walking fast\" with ability to ambulate on TM for 5 minutes at 2.5-3.0 mph.   4-6 weeks  NT     Pt will increase bilateral SLR by 5-10 deg to allow for improved stride length to normalize gait pattern. 4-6 weeks  MET     Pt will show improved strength of right HS, hip add/abd/ext by 1/2 grade to allow for improved gait and tolerance for adls. 8-12 weeks  Ongoing     Increase LEFS >/= 9 points to increase function. 4-6 weeks  ongoing  11/29: 65/80   Increase LEFS >/= 18 points to increase function   8-12 weeks  Progress     Improve 30 sec STS to 13 reps or more without UE use to indicate improving LE functional strength. 8-12 weeks  Ongoing     Pt will be independent with advanced HEP to increase carryover at home 8-12 weeks  Ongoing     Pt will increase distance during 6mWT by 191 feet or more to meet the MCID indicating significant improvement in endurance. 4-6 weeks  NT     Pt will increase distance during 6mWT by an additional 191 feet or more to meet the MCID indicating significant improvement in endurance. 8-12 weeks  NT     Pt will be properly referred to MD and orthotist for assessment for right LE AFO to " improve gait dynamics to decrease left LE pain. 8-12 weeks MET 10/30/23 - appt set with Mani Gabriel Orthotist   Pt will have increased right dorsiflexion by 5 degrees to improve ability to perform more normalized gait cycle to reduce risk of falls. 2-4 weeks Not Met 10/30/23 = -15  11/29: -14   Pt will be able to perform 10 m walk test meeting age specific norms to determine improved gait 4-6 weeks Ongoing 11/29: 1.12 m/s   Pt will be able to walk with use of right AFO without noted tripping to indicate improve gait and decreased fall risk. 4-6 weeks Ongoing 11/29: Pt has not received AFO but has met with orthotist   Pt will be able to perform a mini squat or golfers T to allow him to get into a low cabinet 4-6 weeks NEW    Pt will be independent in primary HEP for right ankle to assure carryover of exercises at home. 2-4 weeks Met    Pt will be independent in advanced HEP for R ankle to assure carryover at home upon discharge. 4-6 weeks NEW    Pt will show overall improvement in function in relation to his right ankle as indicated by improvement on FAAM to 30% disability or less. 4-6 weeks Not met 10/30/23 = 69% disability  11/29: 80% disability                      Julita Do, PT

## 2023-12-13 ENCOUNTER — TELEPHONE (OUTPATIENT)
Dept: PHYSICAL THERAPY | Facility: REHABILITATION | Age: 78
End: 2023-12-13
Payer: MEDICARE

## 2023-12-20 ENCOUNTER — HOSPITAL ENCOUNTER (OUTPATIENT)
Dept: PHYSICAL THERAPY | Facility: REHABILITATION | Age: 78
Setting detail: THERAPIES SERIES
Discharge: HOME | End: 2023-12-20
Attending: FAMILY MEDICINE
Payer: MEDICARE

## 2023-12-20 DIAGNOSIS — M21.371 RIGHT FOOT DROP: ICD-10-CM

## 2023-12-20 DIAGNOSIS — R26.2 DIFFICULTY IN WALKING: ICD-10-CM

## 2023-12-20 DIAGNOSIS — S70.12XA CONTUSION OF LEFT THIGH, INITIAL ENCOUNTER: Primary | ICD-10-CM

## 2023-12-20 PROCEDURE — 97530 THERAPEUTIC ACTIVITIES: CPT | Mod: GP

## 2023-12-20 PROCEDURE — 97110 THERAPEUTIC EXERCISES: CPT | Mod: GP

## 2023-12-20 NOTE — OP PT TREATMENT LOG
ORTHO PT FLOWSHEET     Exercises Current Session Time y/n   MODALITIES- HEAT/ICE  CPT 86975 TOTAL TIME FOR SESSION Not performed     Heat       Ice/Vasocompression       MODALITIES - E-STIM  CPT 10362    TOTAL TIME FOR SESSION Not performed     E-stim/H-Wave       MODALITIES - US TOTAL TIME FOR SESSION Not performed     US (CPT 05040)       Iontophoresis (CPT 78627)       Mechanical Traction       THER ACT  CPT 74074 TOTAL TIME FOR SESSION 30 minutes     Pain, falls, med changes completed y   Pt education Pt educated regarding IE results and POC and need for active participation in skilled PT intervention and HEP.  Discussed with pt follow up with MD regarding possible right LE AFO to avoid further tripping and reinjury.  10/18/23 - reviewed possibility of AFO and pt shown normal hinged AFO, toe off and Spry step brace as options for dorsiflexion assistance in right LE to prevent further tripping/falling injuries.  Reviewed with pt heel toe gait pattern.  Also to discuss possibility of dorsiflexion assist brace vs/with trial of PT to improve mobility in right ankle as it is cause of left leg injury.   10/30/23 - pt education re:  Addition of right ankle to POC.  Instructed in HEP for right ankle.  See TE section.  Pt also educated in the use of his home EMS unit for contraction of anterior tibialis.  Set for 10 sec on/20 seconds off, 50 pps, 350 width.  Pt relayed understanding of set up and need to remove if burning or abnormal pain occurs  11/15/23 - reviewed with pt placement for electrodes for NMES to anterior tibialis on right LE to improve dorsiflexion.  Pt emailed picture of motor point with small electrode on proximal placement/motor  Point and larger pad on distal point as dispersive pad.  Pt appeared to understand.  Also discussed his visit with orthotist and he notes that he will most likely but a fabrid ankle strap with strap to attach to shoe laces to facilitate dorsiflexion and Mani Gabriel the  prosthetist is going to start the process to get him a carbon graphite AFO.  11/29: reviewed placement of electrodes for NMES to anterior tib                                                             y   Assessment 10/30/23 - Reevaluation performed to add right foot drop/deviated gait to plan of care.  Results of right ankle evaluation as well as plan discussed with pt as well as need to monitor progress closely due to long nature of involvement of right ankle.  Also discussed improvement in left thigh and meeting with orthotist Mani Gabriel in future for assessment for possible AFO.  Pt agreeable to this and confirmed desire to meet with orthotist.    11/1/23 - performed 2MWT/6MWT and 30 second STS.  11/29: FAAM, LEFS, ankle ROM, discussion of pain levels and subjective improvement, gait speed                   n  y   Body Mechanics/Work Training       THER EX  CPT 73330 TOTAL TIME FOR SESSION 25 minutes        CARDIOVASCULAR        Nu Step Level 6-3 x 10 minutes [facility]     Recumbent Bike seat #24  x 10 minutes   [employer]  Gear 9      n   Elliptical       Treadmill       HEP 10/30/23 - Pt instructed in a primary HEP for right ankle including ankle dorsiflexion/plantarflexion/inv/ev, calf towel stretch for gastroc and soleus and wall calf stretch.  See chart copy of HEP2Go sheet as issued to pt.     STRENGTHENING       Supine ther-ex       Bridges       Bilateral knee fall outs       Marching with resistance       Ankle DF ecc control           4 way ankle  Prone       HS curls    2 x 10 5 sec  Green band    Blue band next visit  2 x 10 5 sec  Green band    Blue band next visit  2 x 10 5 sec  Green band    Blue band next visit  1 x 10 R foot manual resistance      2 x 10 R foot Blue TB   3 x 10   OTB focus on eccentric control     Y   Y   Y     y   Seated ther-ex      LAQ      STS      HS curls focus eccentric       10 x arms crossed over chest  10 x green band  Blue band next visit     "             Standing ther-ex       Hip flex       Hip abd       Hip ext       Hip add      Heel raises       SLS      Tib Anterior Lowers Add resistance next visit with pink band  2 x 10   2 x 10   2 x 10 standing on 4\" block with R hip extension to clear toes   2 x 10   2/10  3/30 sec  1 x 10    n  n  n  n  n  n  y   STRETCHING       LE stretching       HS strap stretch       Piriformis KTOS       Figure 4 push away       Gastroc stretch       Ankle DF self mobilization     3 x 30 sec B  3 x 30 sec B  3 x 30 sec B  3 x 30 sec R only   In long sitting with strap - knee flex and knee ext  10 x 10 sec R only foot on step with tib translation into DF    Y  Y  Y  Y  y   Other stretching       Incline calf stretch          REPEATED MOVEMENTS       NEURO RE-ED  CPT 44775 TOTAL TIME FOR SESSION Not performed     COORDINATION       POSTURAL RE-ED           PRE-GAIT ACTIVITIES        BALANCE TRAINING        Sitting balance       Standing balance       GAIT TRAINING  CPT 03917 TOTAL TIME FOR SESSION Brief billed in TA     Ambulation  2/6MWT n   Dynamic gait        Stair negotiation       Curb negotiation       Ramp negotiation       Outdoor ambulation       BWS/vector training       MANUAL  CPT 58226 TOTAL TIME FOR SESSION      Stretching        HS        Hip rotators        Hip flexors        Calf strap stretch        PROM R ankle DF/EV    2 x 20 sec  2 x 20 sec  2 x 20 sec  2 x 30 sec  5 x 20 sec       n  n  n  Y   Mobilization        TC joint     10    Y   Massage DTM to left Medial HS pt prone _ n   Taping  prn        10/30/23 -              "

## 2023-12-27 NOTE — ADDENDUM NOTE
Encounter addended by: Mulvihill, Christine Blair, PT on: 12/26/2023 7:47 PM   Actions taken: Episode resolved, Patient Goal completed

## 2024-01-04 ENCOUNTER — TELEPHONE (OUTPATIENT)
Dept: PHYSICAL THERAPY | Facility: REHABILITATION | Age: 79
End: 2024-01-04
Payer: MEDICARE

## 2024-01-05 ENCOUNTER — TELEPHONE (OUTPATIENT)
Dept: PHYSICAL THERAPY | Facility: REHABILITATION | Age: 79
End: 2024-01-05
Payer: MEDICARE

## 2024-05-01 ENCOUNTER — TRANSCRIBE ORDERS (OUTPATIENT)
Dept: REGISTRATION | Facility: HOSPITAL | Age: 79
End: 2024-05-01

## 2024-05-01 ENCOUNTER — APPOINTMENT (OUTPATIENT)
Dept: LAB | Facility: HOSPITAL | Age: 79
End: 2024-05-01
Attending: INTERNAL MEDICINE
Payer: MEDICARE

## 2024-05-01 DIAGNOSIS — I25.10 ATHEROSCLEROTIC HEART DISEASE OF NATIVE CORONARY ARTERY WITHOUT ANGINA PECTORIS: ICD-10-CM

## 2024-05-01 DIAGNOSIS — I25.10 ATHEROSCLEROTIC HEART DISEASE OF NATIVE CORONARY ARTERY WITHOUT ANGINA PECTORIS: Primary | ICD-10-CM

## 2024-05-01 LAB
ALBUMIN SERPL-MCNC: 4.6 G/DL (ref 3.5–5.7)
ALP SERPL-CCNC: 81 IU/L (ref 34–125)
ALT SERPL-CCNC: 24 IU/L (ref 7–52)
ANION GAP SERPL CALC-SCNC: 6 MEQ/L (ref 3–15)
AST SERPL-CCNC: 21 IU/L (ref 13–39)
BILIRUB SERPL-MCNC: 0.9 MG/DL (ref 0.3–1.2)
BUN SERPL-MCNC: 20 MG/DL (ref 7–25)
CALCIUM SERPL-MCNC: 9.5 MG/DL (ref 8.6–10.3)
CHLORIDE SERPL-SCNC: 105 MEQ/L (ref 98–107)
CHOLEST SERPL-MCNC: 121 MG/DL
CO2 SERPL-SCNC: 29 MEQ/L (ref 21–31)
CREAT SERPL-MCNC: 1 MG/DL (ref 0.7–1.3)
EGFRCR SERPLBLD CKD-EPI 2021: >60 ML/MIN/1.73M*2
GLUCOSE SERPL-MCNC: 107 MG/DL (ref 70–99)
HDLC SERPL-MCNC: 42 MG/DL
HDLC SERPL: 2.9 {RATIO}
LDLC SERPL CALC-MCNC: 56 MG/DL
NONHDLC SERPL-MCNC: 79 MG/DL
POTASSIUM SERPL-SCNC: 4.7 MEQ/L (ref 3.5–5.1)
PROT SERPL-MCNC: 6.7 G/DL (ref 6–8.2)
SODIUM SERPL-SCNC: 140 MEQ/L (ref 136–145)
TRIGL SERPL-MCNC: 114 MG/DL

## 2024-05-01 PROCEDURE — 36415 COLL VENOUS BLD VENIPUNCTURE: CPT

## 2024-05-01 PROCEDURE — 80061 LIPID PANEL: CPT

## 2024-05-01 PROCEDURE — 80053 COMPREHEN METABOLIC PANEL: CPT

## 2025-05-27 ENCOUNTER — TRANSCRIBE ORDERS (OUTPATIENT)
Dept: REGISTRATION | Facility: HOSPITAL | Age: 80
End: 2025-05-27

## 2025-05-27 ENCOUNTER — APPOINTMENT (OUTPATIENT)
Dept: LAB | Facility: HOSPITAL | Age: 80
End: 2025-05-27
Attending: INTERNAL MEDICINE
Payer: MEDICARE

## 2025-05-27 DIAGNOSIS — I10 ESSENTIAL HYPERTENSION, MALIGNANT: ICD-10-CM

## 2025-05-27 DIAGNOSIS — Z95.1 POSTSURGICAL AORTOCORONARY BYPASS STATUS: ICD-10-CM

## 2025-05-27 DIAGNOSIS — I65.23 BILATERAL CAROTID ARTERY OCCLUSION: ICD-10-CM

## 2025-05-27 DIAGNOSIS — I25.10 CORONARY ATHEROSCLEROSIS OF NATIVE CORONARY ARTERY: Primary | ICD-10-CM

## 2025-05-27 DIAGNOSIS — Z98.61 POSTSURGICAL PERCUTANEOUS TRANSLUMINAL CORONARY ANGIOPLASTY STATUS: ICD-10-CM

## 2025-05-27 DIAGNOSIS — I25.10 CORONARY ATHEROSCLEROSIS OF NATIVE CORONARY ARTERY: ICD-10-CM

## 2025-05-27 LAB
ALBUMIN SERPL-MCNC: 4.3 G/DL (ref 3.5–5.7)
ALP SERPL-CCNC: 69 IU/L (ref 34–125)
ALT SERPL-CCNC: 18 IU/L (ref 7–52)
ANION GAP SERPL CALC-SCNC: 6 MEQ/L (ref 3–15)
AST SERPL-CCNC: 15 IU/L (ref 13–39)
BILIRUB SERPL-MCNC: 0.6 MG/DL (ref 0.3–1.2)
BUN SERPL-MCNC: 22 MG/DL (ref 7–25)
CALCIUM SERPL-MCNC: 8.9 MG/DL (ref 8.6–10.3)
CHLORIDE SERPL-SCNC: 107 MEQ/L (ref 98–107)
CHOLEST SERPL-MCNC: 109 MG/DL
CO2 SERPL-SCNC: 29 MEQ/L (ref 21–31)
CREAT SERPL-MCNC: 0.8 MG/DL (ref 0.7–1.3)
EGFRCR SERPLBLD CKD-EPI 2021: >60 ML/MIN/1.73M*2
ERYTHROCYTE [DISTWIDTH] IN BLOOD BY AUTOMATED COUNT: 13.1 % (ref 11.6–14.4)
GLUCOSE SERPL-MCNC: 99 MG/DL (ref 70–99)
HCT VFR BLD AUTO: 37.7 % (ref 40.1–51)
HDLC SERPL-MCNC: 50 MG/DL
HDLC SERPL: 2.2 {RATIO}
HGB BLD-MCNC: 12.5 G/DL (ref 13.7–17.5)
LDLC SERPL CALC-MCNC: 44 MG/DL
MCH RBC QN AUTO: 31.8 PG (ref 28–33.2)
MCHC RBC AUTO-ENTMCNC: 33.2 G/DL (ref 32.2–36.5)
MCV RBC AUTO: 95.9 FL (ref 83–98)
NONHDLC SERPL-MCNC: 59 MG/DL
PLATELET # BLD AUTO: 186 K/UL (ref 150–350)
PMV BLD AUTO: 11.3 FL (ref 9.4–12.4)
POTASSIUM SERPL-SCNC: 3.7 MEQ/L (ref 3.5–5.1)
PROT SERPL-MCNC: 6.4 G/DL (ref 6–8.2)
RBC # BLD AUTO: 3.93 M/UL (ref 4.5–5.8)
SODIUM SERPL-SCNC: 142 MEQ/L (ref 136–145)
TRIGL SERPL-MCNC: 74 MG/DL
TSH SERPL DL<=0.05 MIU/L-ACNC: 2.06 MIU/L (ref 0.34–5.6)
WBC # BLD AUTO: 11.04 K/UL (ref 3.8–10.5)

## 2025-05-27 PROCEDURE — 80053 COMPREHEN METABOLIC PANEL: CPT

## 2025-05-27 PROCEDURE — 85027 COMPLETE CBC AUTOMATED: CPT

## 2025-05-27 PROCEDURE — 80061 LIPID PANEL: CPT

## 2025-05-27 PROCEDURE — 84443 ASSAY THYROID STIM HORMONE: CPT

## 2025-05-27 PROCEDURE — 36415 COLL VENOUS BLD VENIPUNCTURE: CPT

## 2025-07-02 ENCOUNTER — APPOINTMENT (OUTPATIENT)
Dept: LAB | Facility: HOSPITAL | Age: 80
End: 2025-07-02
Attending: FAMILY MEDICINE
Payer: MEDICARE

## 2025-07-02 DIAGNOSIS — M1A.09X0 IDIOPATHIC CHRONIC GOUT OF MULTIPLE SITES WITHOUT TOPHUS: ICD-10-CM

## 2025-07-02 DIAGNOSIS — D64.9 ANEMIA, UNSPECIFIED: ICD-10-CM

## 2025-07-02 LAB
BASOPHILS # BLD: 0.05 K/UL (ref 0.01–0.1)
BASOPHILS NFR BLD: 0.9 %
DIFFERENTIAL METHOD BLD: ABNORMAL
EOSINOPHIL # BLD: 0.17 K/UL (ref 0.04–0.54)
EOSINOPHIL NFR BLD: 3.1 %
ERYTHROCYTE [DISTWIDTH] IN BLOOD BY AUTOMATED COUNT: 12.9 % (ref 11.6–14.4)
FERRITIN SERPL-MCNC: 102 NG/ML (ref 24–250)
HCT VFR BLD AUTO: 38.8 % (ref 40.1–51)
HGB BLD-MCNC: 12.6 G/DL (ref 13.7–17.5)
IMM GRANULOCYTES # BLD AUTO: 0.02 K/UL (ref 0–0.08)
IMM GRANULOCYTES NFR BLD AUTO: 0.4 %
IRON SATN MFR SERPL: 24 % (ref 15–45)
IRON SERPL-MCNC: 93 UG/DL (ref 35–150)
LYMPHOCYTES # BLD: 1.35 K/UL (ref 1.2–3.5)
LYMPHOCYTES NFR BLD: 24.4 %
MCH RBC QN AUTO: 31.1 PG (ref 28–33.2)
MCHC RBC AUTO-ENTMCNC: 32.5 G/DL (ref 32.2–36.5)
MCV RBC AUTO: 95.8 FL (ref 83–98)
MONOCYTES # BLD: 0.75 K/UL (ref 0.3–1)
MONOCYTES NFR BLD: 13.5 %
NEUTROPHILS # BLD: 3.2 K/UL (ref 1.7–7)
NEUTS SEG NFR BLD: 57.7 %
NRBC BLD-RTO: 0 %
PLATELET # BLD AUTO: 199 K/UL (ref 150–350)
PMV BLD AUTO: 10.7 FL (ref 9.4–12.4)
RBC # BLD AUTO: 4.05 M/UL (ref 4.5–5.8)
RETICS #: 0.06 M/UL (ref 0–0.12)
RETICS/RBC NFR: 1.36 % (ref 0.6–2.8)
TIBC SERPL-MCNC: 384 UG/DL (ref 270–460)
UIBC SERPL-MCNC: 291 UG/DL (ref 180–360)
URATE SERPL-MCNC: 5.8 MG/DL (ref 4.4–7.6)
VIT B12 SERPL-MCNC: 367 PG/ML (ref 180–914)
WBC # BLD AUTO: 5.54 K/UL (ref 3.8–10.5)

## 2025-07-02 PROCEDURE — 84550 ASSAY OF BLOOD/URIC ACID: CPT

## 2025-07-02 PROCEDURE — 85045 AUTOMATED RETICULOCYTE COUNT: CPT

## 2025-07-02 PROCEDURE — 85025 COMPLETE CBC W/AUTO DIFF WBC: CPT

## 2025-07-02 PROCEDURE — 82728 ASSAY OF FERRITIN: CPT

## 2025-07-02 PROCEDURE — 82607 VITAMIN B-12: CPT | Mod: GA

## 2025-07-02 PROCEDURE — 83550 IRON BINDING TEST: CPT

## 2025-07-02 PROCEDURE — 36415 COLL VENOUS BLD VENIPUNCTURE: CPT

## 2025-08-12 ENCOUNTER — APPOINTMENT (OUTPATIENT)
Dept: LAB | Facility: HOSPITAL | Age: 80
End: 2025-08-12
Attending: FAMILY MEDICINE
Payer: MEDICARE